# Patient Record
Sex: MALE | Race: WHITE | NOT HISPANIC OR LATINO | Employment: OTHER | URBAN - METROPOLITAN AREA
[De-identification: names, ages, dates, MRNs, and addresses within clinical notes are randomized per-mention and may not be internally consistent; named-entity substitution may affect disease eponyms.]

---

## 2023-04-21 LAB
ALANINE AMINOTRANSFERASE (SGPT) (U/L) IN SER/PLAS: 20 U/L (ref 10–52)
ALBUMIN (G/DL) IN SER/PLAS: 4.6 G/DL (ref 3.4–5)
ALKALINE PHOSPHATASE (U/L) IN SER/PLAS: 54 U/L (ref 33–136)
ANION GAP IN SER/PLAS: 14 MMOL/L (ref 10–20)
ASPARTATE AMINOTRANSFERASE (SGOT) (U/L) IN SER/PLAS: 33 U/L (ref 9–39)
BILIRUBIN TOTAL (MG/DL) IN SER/PLAS: 1 MG/DL (ref 0–1.2)
CALCIUM (MG/DL) IN SER/PLAS: 9.7 MG/DL (ref 8.6–10.6)
CARBON DIOXIDE, TOTAL (MMOL/L) IN SER/PLAS: 28 MMOL/L (ref 21–32)
CHLORIDE (MMOL/L) IN SER/PLAS: 103 MMOL/L (ref 98–107)
CHOLESTEROL (MG/DL) IN SER/PLAS: 142 MG/DL (ref 0–199)
CHOLESTEROL IN HDL (MG/DL) IN SER/PLAS: 59.2 MG/DL
CHOLESTEROL/HDL RATIO: 2.4
CREATININE (MG/DL) IN SER/PLAS: 1.16 MG/DL (ref 0.5–1.3)
GFR MALE: 71 ML/MIN/1.73M2
GLUCOSE (MG/DL) IN SER/PLAS: 86 MG/DL (ref 74–99)
LDL: 71 MG/DL (ref 0–99)
POTASSIUM (MMOL/L) IN SER/PLAS: 3.8 MMOL/L (ref 3.5–5.3)
PROTEIN TOTAL: 6.9 G/DL (ref 6.4–8.2)
SODIUM (MMOL/L) IN SER/PLAS: 141 MMOL/L (ref 136–145)
TRIGLYCERIDE (MG/DL) IN SER/PLAS: 61 MG/DL (ref 0–149)
UREA NITROGEN (MG/DL) IN SER/PLAS: 21 MG/DL (ref 6–23)
VLDL: 12 MG/DL (ref 0–40)

## 2023-09-28 ENCOUNTER — APPOINTMENT (OUTPATIENT)
Dept: PRIMARY CARE | Facility: CLINIC | Age: 63
End: 2023-09-28
Payer: COMMERCIAL

## 2023-10-04 PROBLEM — Q23.3: Status: ACTIVE | Noted: 2023-10-04

## 2023-10-04 PROBLEM — K40.90 INGUINAL HERNIA: Status: ACTIVE | Noted: 2023-10-04

## 2023-10-04 PROBLEM — I34.1 MVP (MITRAL VALVE PROLAPSE): Status: ACTIVE | Noted: 2023-10-04

## 2023-10-04 PROBLEM — I10 HYPERTENSION: Status: ACTIVE | Noted: 2023-10-04

## 2023-10-04 PROBLEM — R93.1 AGATSTON CAC SCORE, >400: Status: ACTIVE | Noted: 2023-10-04

## 2023-10-04 PROBLEM — M48.061 LUMBAR SPINAL STENOSIS: Status: ACTIVE | Noted: 2023-10-04

## 2023-10-04 PROBLEM — M54.50 INTERMITTENT LOW BACK PAIN: Status: ACTIVE | Noted: 2023-10-04

## 2023-10-04 RX ORDER — ATORVASTATIN CALCIUM 20 MG/1
1 TABLET, FILM COATED ORAL DAILY
COMMUNITY
Start: 2022-09-23 | End: 2023-10-06 | Stop reason: ALTCHOICE

## 2023-10-04 RX ORDER — HYDROCHLOROTHIAZIDE 25 MG/1
1 TABLET ORAL DAILY
COMMUNITY
Start: 2022-09-23 | End: 2023-11-06

## 2023-10-04 ASSESSMENT — PROMIS GLOBAL HEALTH SCALE
RATE_AVERAGE_PAIN: 2
EMOTIONAL_PROBLEMS: NEVER
RATE_GENERAL_HEALTH: VERY GOOD
RATE_QUALITY_OF_LIFE: VERY GOOD
RATE_PHYSICAL_HEALTH: VERY GOOD
CARRYOUT_PHYSICAL_ACTIVITIES: COMPLETELY
RATE_MENTAL_HEALTH: VERY GOOD
CARRYOUT_SOCIAL_ACTIVITIES: VERY GOOD
RATE_SOCIAL_SATISFACTION: VERY GOOD

## 2023-10-06 ENCOUNTER — LAB (OUTPATIENT)
Dept: LAB | Facility: LAB | Age: 63
End: 2023-10-06
Payer: COMMERCIAL

## 2023-10-06 ENCOUNTER — OFFICE VISIT (OUTPATIENT)
Dept: PRIMARY CARE | Facility: CLINIC | Age: 63
End: 2023-10-06
Payer: COMMERCIAL

## 2023-10-06 VITALS
HEART RATE: 56 BPM | OXYGEN SATURATION: 99 % | TEMPERATURE: 97.2 F | WEIGHT: 170 LBS | DIASTOLIC BLOOD PRESSURE: 80 MMHG | BODY MASS INDEX: 23.03 KG/M2 | SYSTOLIC BLOOD PRESSURE: 134 MMHG | HEIGHT: 72 IN

## 2023-10-06 DIAGNOSIS — Z00.00 ANNUAL PHYSICAL EXAM: ICD-10-CM

## 2023-10-06 DIAGNOSIS — Z00.00 ANNUAL PHYSICAL EXAM: Primary | ICD-10-CM

## 2023-10-06 DIAGNOSIS — Z23 NEED FOR INFLUENZA VACCINATION: ICD-10-CM

## 2023-10-06 DIAGNOSIS — I10 PRIMARY HYPERTENSION: ICD-10-CM

## 2023-10-06 DIAGNOSIS — K40.90 NON-RECURRENT UNILATERAL INGUINAL HERNIA WITHOUT OBSTRUCTION OR GANGRENE: ICD-10-CM

## 2023-10-06 DIAGNOSIS — R93.1 AGATSTON CAC SCORE, >400: ICD-10-CM

## 2023-10-06 PROCEDURE — 81003 URINALYSIS AUTO W/O SCOPE: CPT

## 2023-10-06 PROCEDURE — 80053 COMPREHEN METABOLIC PANEL: CPT

## 2023-10-06 PROCEDURE — 90471 IMMUNIZATION ADMIN: CPT | Performed by: INTERNAL MEDICINE

## 2023-10-06 PROCEDURE — 3079F DIAST BP 80-89 MM HG: CPT | Performed by: INTERNAL MEDICINE

## 2023-10-06 PROCEDURE — 36415 COLL VENOUS BLD VENIPUNCTURE: CPT

## 2023-10-06 PROCEDURE — 3075F SYST BP GE 130 - 139MM HG: CPT | Performed by: INTERNAL MEDICINE

## 2023-10-06 PROCEDURE — 90686 IIV4 VACC NO PRSV 0.5 ML IM: CPT | Performed by: INTERNAL MEDICINE

## 2023-10-06 PROCEDURE — 80061 LIPID PANEL: CPT

## 2023-10-06 PROCEDURE — 85027 COMPLETE CBC AUTOMATED: CPT

## 2023-10-06 PROCEDURE — 84153 ASSAY OF PSA TOTAL: CPT

## 2023-10-06 PROCEDURE — 99396 PREV VISIT EST AGE 40-64: CPT | Performed by: INTERNAL MEDICINE

## 2023-10-06 PROCEDURE — 1036F TOBACCO NON-USER: CPT | Performed by: INTERNAL MEDICINE

## 2023-10-06 RX ORDER — ATORVASTATIN CALCIUM 10 MG/1
10 TABLET, FILM COATED ORAL DAILY
COMMUNITY
End: 2024-04-12 | Stop reason: WASHOUT

## 2023-10-06 ASSESSMENT — LIFESTYLE VARIABLES
HOW OFTEN DO YOU HAVE SIX OR MORE DRINKS ON ONE OCCASION: NEVER
HOW OFTEN DO YOU HAVE A DRINK CONTAINING ALCOHOL: 4 OR MORE TIMES A WEEK
HOW MANY STANDARD DRINKS CONTAINING ALCOHOL DO YOU HAVE ON A TYPICAL DAY: 1 OR 2
SKIP TO QUESTIONS 9-10: 1
AUDIT-C TOTAL SCORE: 4

## 2023-10-06 ASSESSMENT — PATIENT HEALTH QUESTIONNAIRE - PHQ9
1. LITTLE INTEREST OR PLEASURE IN DOING THINGS: NOT AT ALL
2. FEELING DOWN, DEPRESSED OR HOPELESS: NOT AT ALL
SUM OF ALL RESPONSES TO PHQ9 QUESTIONS 1 & 2: 0

## 2023-10-06 ASSESSMENT — COLUMBIA-SUICIDE SEVERITY RATING SCALE - C-SSRS
1. IN THE PAST MONTH, HAVE YOU WISHED YOU WERE DEAD OR WISHED YOU COULD GO TO SLEEP AND NOT WAKE UP?: NO
2. HAVE YOU ACTUALLY HAD ANY THOUGHTS OF KILLING YOURSELF?: NO
6. HAVE YOU EVER DONE ANYTHING, STARTED TO DO ANYTHING, OR PREPARED TO DO ANYTHING TO END YOUR LIFE?: NO

## 2023-10-06 NOTE — PROGRESS NOTES
Subjective   Patient ID: Facundo Ravi is a 63 y.o. male who presents for Annual Exam.    HPI   Not checking BP at home.  No lightheadedness, no CP, no HA.    Tolerating statin without issues.    L inguinal hernia hasn't caused him discomfort.    He got a letter from GI that he is due for colonoscopy.    Exercising daily  Diet healthy      Review of Systems    Objective   /80   Pulse 56   Temp 36.2 °C (97.2 °F)   Ht 1.829 m (6')   Wt 77.1 kg (170 lb)   SpO2 99%   BMI 23.06 kg/m²     Physical Exam  Constitutional:       Appearance: Normal appearance.   HENT:      Right Ear: Tympanic membrane and ear canal normal.      Left Ear: Tympanic membrane and ear canal normal.      Mouth/Throat:      Mouth: Mucous membranes are moist.   Eyes:      Extraocular Movements: Extraocular movements intact.      Pupils: Pupils are equal, round, and reactive to light.   Cardiovascular:      Rate and Rhythm: Normal rate and regular rhythm.      Heart sounds: No murmur heard.  Pulmonary:      Effort: Pulmonary effort is normal.      Breath sounds: Normal breath sounds.   Abdominal:      General: Bowel sounds are normal.      Palpations: Abdomen is soft.      Tenderness: There is no abdominal tenderness.      Comments: Nontender reducible L inguinal hernia   Musculoskeletal:         General: No deformity.      Cervical back: Neck supple.      Right lower leg: No edema.      Left lower leg: No edema.   Skin:     Findings: No lesion or rash.   Neurological:      Mental Status: He is alert.      Cranial Nerves: No cranial nerve deficit.      Motor: No weakness.      Gait: Gait normal.         Assessment/Plan   Problem List Items Addressed This Visit             ICD-10-CM    Agatston CAC score, >400 R93.1    Hypertension I10    Inguinal hernia K40.90    Relevant Orders    Referral to General Surgery    Annual physical exam - Primary Z00.00    Relevant Orders    Colonoscopy Screening    CBC    Comprehensive Metabolic Panel     Prostate Specific Antigen, Screen    Lipid Panel    Urinalysis with Reflex Microscopic     Other Visit Diagnoses         Codes    Need for influenza vaccination     Z23    Relevant Orders    Flu vaccine (IIV4) age 6 months and greater, preservative free               HTN  -Continue current med. Advised to start checking BP at home, call if >130/80 consistently.    CAC score >500 in 2021  -Continue statin    L inguinal hernia - Surgery referral    Health maintenance:  -Colon cancer screening - Colonoscopy 8/2016 with Dr Vollweiler, normal, due in 7-10 years. Ordered today.  -Prostate - PSA ordered  -HCV neg  -Immunizations    -Pneumonia -Had pneumovax, will need series at age 64   -Shingrix UTD   -Covid booster declined   -Flu - today    Follow up 6 months

## 2023-10-07 LAB
ALBUMIN SERPL BCP-MCNC: 4.7 G/DL (ref 3.4–5)
ALP SERPL-CCNC: 50 U/L (ref 33–136)
ALT SERPL W P-5'-P-CCNC: 25 U/L (ref 10–52)
ANION GAP SERPL CALC-SCNC: 13 MMOL/L (ref 10–20)
APPEARANCE UR: CLEAR
AST SERPL W P-5'-P-CCNC: 36 U/L (ref 9–39)
BILIRUB SERPL-MCNC: 1.3 MG/DL (ref 0–1.2)
BILIRUB UR STRIP.AUTO-MCNC: NEGATIVE MG/DL
BUN SERPL-MCNC: 18 MG/DL (ref 6–23)
CALCIUM SERPL-MCNC: 10.3 MG/DL (ref 8.6–10.6)
CHLORIDE SERPL-SCNC: 100 MMOL/L (ref 98–107)
CHOLEST SERPL-MCNC: 149 MG/DL (ref 0–199)
CHOLESTEROL/HDL RATIO: 2.3
CO2 SERPL-SCNC: 29 MMOL/L (ref 21–32)
COLOR UR: YELLOW
CREAT SERPL-MCNC: 0.9 MG/DL (ref 0.5–1.3)
ERYTHROCYTE [DISTWIDTH] IN BLOOD BY AUTOMATED COUNT: 12.2 % (ref 11.5–14.5)
GFR SERPL CREATININE-BSD FRML MDRD: >90 ML/MIN/1.73M*2
GLUCOSE SERPL-MCNC: 86 MG/DL (ref 74–99)
GLUCOSE UR STRIP.AUTO-MCNC: NEGATIVE MG/DL
HCT VFR BLD AUTO: 43.9 % (ref 41–52)
HDLC SERPL-MCNC: 64.3 MG/DL
HGB BLD-MCNC: 14.2 G/DL (ref 13.5–17.5)
KETONES UR STRIP.AUTO-MCNC: ABNORMAL MG/DL
LDLC SERPL CALC-MCNC: 73 MG/DL (ref 140–190)
LEUKOCYTE ESTERASE UR QL STRIP.AUTO: NEGATIVE
MCH RBC QN AUTO: 29.5 PG (ref 26–34)
MCHC RBC AUTO-ENTMCNC: 32.3 G/DL (ref 32–36)
MCV RBC AUTO: 91 FL (ref 80–100)
NITRITE UR QL STRIP.AUTO: NEGATIVE
NON HDL CHOLESTEROL: 85 MG/DL (ref 0–149)
NRBC BLD-RTO: 0 /100 WBCS (ref 0–0)
PH UR STRIP.AUTO: 5 [PH]
PLATELET # BLD AUTO: 237 X10*3/UL (ref 150–450)
PMV BLD AUTO: 10.3 FL (ref 7.5–11.5)
POTASSIUM SERPL-SCNC: 4 MMOL/L (ref 3.5–5.3)
PROT SERPL-MCNC: 7.3 G/DL (ref 6.4–8.2)
PROT UR STRIP.AUTO-MCNC: NEGATIVE MG/DL
PSA SERPL-MCNC: 0.66 NG/ML
RBC # BLD AUTO: 4.81 X10*6/UL (ref 4.5–5.9)
RBC # UR STRIP.AUTO: NEGATIVE /UL
SODIUM SERPL-SCNC: 138 MMOL/L (ref 136–145)
SP GR UR STRIP.AUTO: 1.02
TRIGL SERPL-MCNC: 57 MG/DL (ref 0–149)
UROBILINOGEN UR STRIP.AUTO-MCNC: <2 MG/DL
VLDL: 11 MG/DL (ref 0–40)
WBC # BLD AUTO: 5.6 X10*3/UL (ref 4.4–11.3)

## 2023-10-23 ENCOUNTER — HOSPITAL ENCOUNTER (OUTPATIENT)
Dept: CARDIOLOGY | Facility: CLINIC | Age: 63
Discharge: HOME | End: 2023-10-23
Payer: COMMERCIAL

## 2023-10-23 ENCOUNTER — OFFICE VISIT (OUTPATIENT)
Dept: SURGERY | Facility: CLINIC | Age: 63
End: 2023-10-23
Payer: COMMERCIAL

## 2023-10-23 ENCOUNTER — PREP FOR PROCEDURE (OUTPATIENT)
Dept: SURGERY | Facility: CLINIC | Age: 63
End: 2023-10-23

## 2023-10-23 VITALS
OXYGEN SATURATION: 100 % | HEIGHT: 72 IN | DIASTOLIC BLOOD PRESSURE: 65 MMHG | WEIGHT: 173.3 LBS | SYSTOLIC BLOOD PRESSURE: 136 MMHG | HEART RATE: 52 BPM | BODY MASS INDEX: 23.47 KG/M2

## 2023-10-23 DIAGNOSIS — K40.90 NON-RECURRENT UNILATERAL INGUINAL HERNIA WITHOUT OBSTRUCTION OR GANGRENE: ICD-10-CM

## 2023-10-23 DIAGNOSIS — K40.90 REDUCIBLE LEFT INGUINAL HERNIA: Primary | ICD-10-CM

## 2023-10-23 DIAGNOSIS — Z01.818 PREOP TESTING: ICD-10-CM

## 2023-10-23 DIAGNOSIS — Z01.818 PREOP TESTING: Primary | ICD-10-CM

## 2023-10-23 PROCEDURE — 93010 ELECTROCARDIOGRAM REPORT: CPT | Performed by: INTERNAL MEDICINE

## 2023-10-23 PROCEDURE — 93005 ELECTROCARDIOGRAM TRACING: CPT

## 2023-10-23 PROCEDURE — 3078F DIAST BP <80 MM HG: CPT | Performed by: SURGERY

## 2023-10-23 PROCEDURE — 1036F TOBACCO NON-USER: CPT | Performed by: SURGERY

## 2023-10-23 PROCEDURE — 99204 OFFICE O/P NEW MOD 45 MIN: CPT | Performed by: SURGERY

## 2023-10-23 PROCEDURE — 3075F SYST BP GE 130 - 139MM HG: CPT | Performed by: SURGERY

## 2023-10-23 RX ORDER — TRAMADOL HYDROCHLORIDE 50 MG/1
50 TABLET ORAL EVERY 8 HOURS PRN
Qty: 9 TABLET | Refills: 0 | Status: SHIPPED | OUTPATIENT
Start: 2023-10-23 | End: 2023-10-26

## 2023-10-23 RX ORDER — SODIUM CHLORIDE, SODIUM LACTATE, POTASSIUM CHLORIDE, CALCIUM CHLORIDE 600; 310; 30; 20 MG/100ML; MG/100ML; MG/100ML; MG/100ML
100 INJECTION, SOLUTION INTRAVENOUS CONTINUOUS
Status: CANCELLED | OUTPATIENT
Start: 2023-10-23

## 2023-10-23 ASSESSMENT — PAIN SCALES - GENERAL: PAINLEVEL: 0-NO PAIN

## 2023-10-23 NOTE — H&P (VIEW-ONLY)
History Of Present Illness :  Facundo Ravi is a 63 y.o. male who presents for an inguinal evaluation on referral from his primary physician, Dr. Giovanni Landa.  The patient recently saw  on 10/6/2023 that note was reviewed.  On examination, a nontender reducible left inguinal hernia was identified.  Laboratory values that day included a CBC and CMP both of which were normal.  The patient is due for colonoscopy and this has been arranged.  His last colonoscopy was August 2016.    About 6 months ago in the spring, the patient noted a bulge in the left groin on self-exam when showering.  This was confirmed to be a hernia on the visit shortly thereafter with Dr. Landa.  As the hernia was asymptomatic, the patient elected to wait till the fall to have repair.  He is very active.  He plays softball and basketball and works out on a regular basis.  The patient's had no symptoms.  He has no associated pain.  He notes no bulge on the contralateral side.  He denies any gastrointestinal symptoms such as nausea vomiting diarrhea constipation changes bowel habits or blood in the stool.  He has had no abdominal or inguinal operations in the past.  He has had no major operations.  He is in good health other than hypertension.    Patient is .  Lives in [place].  He has 7 children, 6 of whom are living.  2 of his sons live with him and his wife at the house.  He is retired from [employer] - .        Past Medical History   Past Medical History:   Diagnosis Date    Acne, unspecified 10/27/2017    Adult acne    Acute eczematoid otitis externa, unspecified ear 09/26/2014    Otitis externa, acute eczematoid    Allergic 1965    Biliuria 10/08/2021    Bilirubin in urine    Disorder of pigmentation, unspecified 05/03/2018    Pigmented skin lesion of uncertain nature    Elevated blood-pressure reading, without diagnosis of hypertension 12/08/2013    Elevated blood pressure reading without  diagnosis of hypertension    Encounter for general adult medical examination without abnormal findings 12/08/2013    Physical exam, routine    Encounter for general adult medical examination without abnormal findings 11/03/2020    Physical exam, annual    Encounter for general adult medical examination without abnormal findings 10/22/2019    Encounter for preventive health examination    Encounter for health counseling related to travel 02/12/2015    Counseling for travel    Encounter for immunization 12/08/2013    Need for prophylactic vaccination and inoculation against influenza    Encounter for immunization 11/03/2020    Need for DTP vaccine    Encounter for screening for malignant neoplasm of colon 11/03/2020    Colon cancer screening    Encounter for screening for malignant neoplasm of colon 12/08/2013    Encounter for screening for malignant neoplasm of colon    Encounter for screening for malignant neoplasm of prostate 11/03/2020    Prostate cancer screening    Encounter for screening for other viral diseases 11/03/2020    Need for hepatitis C screening test    HL (hearing loss) 2000    Hypertension 2021    Low back pain, unspecified 12/08/2013    Lumbago    Olecranon bursitis, right elbow 05/31/2018    Olecranon bursitis of right elbow    Other microscopic hematuria     Microscopic hematuria    Pain in right hip 10/22/2019    Hip pain, right    Personal history of diseases of the skin and subcutaneous tissue 09/21/2018    History of eczema    Personal history of other endocrine, nutritional and metabolic disease 10/22/2019    History of hypercalcemia    Personal history of other specified conditions 10/22/2019    History of nocturia    Radiculopathy, thoracic region 02/23/2015    Radiculopathy of thoracic region    Spinal stenosis, lumbar region without neurogenic claudication 10/22/2019    Foraminal stenosis of lumbar region    Sprain of unspecified parts of thorax, initial encounter 02/27/2015    Thoracic  back sprain    Xerosis cutis 09/26/2014    Dry skin        Surgical History    Past Surgical History:   Procedure Laterality Date    COLONOSCOPY  10/22/2019    Complete Colonoscopy    EYE SURGERY  2003    OTHER SURGICAL HISTORY  09/26/2014    Need For Vaccination Typhoid    OTHER SURGICAL HISTORY  09/02/2016    Lymph Node Biopsy - Needle        Allergies   Allergies   Allergen Reactions    Amoxicillin Unknown    Penicillins Hives     SEVERE        Home Meds  Current Outpatient Medications   Medication Instructions    atorvastatin (LIPITOR) 10 mg, oral, Daily    hydroCHLOROthiazide (HYDRODiuril) 25 mg tablet 1 tablet, oral, Daily        Family History    Family History   Problem Relation Name Age of Onset    Other (CEREBRAL INFARCTION) Mother Macie V     Hearing loss Mother Macie V     Stroke Mother Macie V     Other (CAROTID ARTERIAL DISEASE) Father Hector V     Coronary artery disease Father Hector V     Heart disease Father Hector V     Hernia Father Hector V     Hypertension Father Hector V     Crohn's disease Daughter Andreia V     Wilm's tumor Daughter Andreia V     Cancer Daughter Andreia V     Colon cancer Maternal Grandfather Michelet COTA     Ulcers Sibling      Arthritis Maternal Grandmother Melissa COTA     Heart disease Paternal Grandfather Danny V         Social History  Social History     Tobacco Use    Smoking status: Never    Smokeless tobacco: Never   Substance Use Topics    Alcohol use: Yes    Drug use: Never        Review Of Systems    Review of Systems    General: Not Present- Obesity, Cancer, HIV, MRSA, Recent Cold/Flu, Tired During the Day and VRE.  HEENT: Not Present- Migraine, Cataracts, Glaucoma, Macular Degeneration and Retinal Detachment.  Respiratory:Not Present- Asthma, Chronic Cough, Difficulty Breathing on Exertion, Difficulty Breathing at Rest, Emphysema, Frequent Bronchitis, Home CPAP/BiPAP, Home Oxygen, Pulmonary Embolus, Pneumonia/TB, Sleep Apnea and Snoring.  Cardiovascular: Not Present-  Chest Pain, Congestive Heart Failure, Heart Attack, Coronary Artery Disease, Heart Stent, High Cholesterol/Lipids, Internal Defibrillator, Irregular Heart Beat, Mitral Valve Prolapse, Murmur, Pacemaker and Peripheral Vascular Disease.  Gastrointestinal: Not Present- Heartburn, Hepatitis, Hiatal Hernia, Jaundice, Reflux, Stomach Ulcer and IBS.  Male Genitourinary: Not Present- Enlarged Prostate, Kidney Failure, Kidney Stones, Dialysis and Urinary Tract Infection.  Musculoskeletal: Not Present- Arthritis, Back Pain and Fibromyalgia.  Neurological: Not Present- Headaches, Numbness, Tingling, Seizures, Stroke,  Shunt and Weakness.  Psychiatric: Not Present- Anxiety, Bipolar, Depression and Panic Attacks.  Endocrine: Not Present- Diabetes, Hyperthyroidism, Hypothyroidism and Low Blood Sugar.  Hematology: Not Present- Abnormal Bleeding, Anemia and Blood Clots.    Vitals  Visit Vitals  /65 (BP Location: Right arm, Patient Position: Sitting, BP Cuff Size: Small adult)   Pulse 52        Physical Exam   The physical exam findings are as follows:    General Appearance - Cooperative and Well groomed. Not in acute distress. Build & Nutrition - Well nourished.  Posture - Normal posture. Gait - Normal. Hydration - Well hydrated.    Integumentary  General Characteristics: Overall examination of the patient's skin reveals - no rashes, no suspicious lesions, no bruises and no evidence of scars. Color - normal coloration of skin. Skin Moisture - normal skin moisture. Temperature - normal  warmth is noted. Texture - normal skin texture.    Head and Neck  Head - normocephalic, atraumatic with no lesions or palpable masses.  Neck  Global Assessment - full range of motion. no bruit auscultated on the right, no bruit auscultated on the left, non-tender, no lymphadenopathy, no palpable mass on the right and no palpable mass on the left.  Trachea - midline.  Thyroid Gland Characteristics - no palpable nodules, normal position,  symmetric and smooth.    Eyes  Sclera/Conjunctiva - Bilateral - Normal. Pupil - Bilateral - Accommodating, Direct reaction to light normal and Equal.    ENMT  Global Assessment  Upon examination of the ears, nose, mouth and throat - examination of the oral cavity reveals normal lips, teeth, gums and oral mucosa and hard and soft palates, tongue, tonsils and posterior pharynx are moist and symmetric without lesions.    Chest and Lung Exam  Inspection: Shape - Symmetric. Movements - Symmetrical. Accessory muscles - No use of accessory muscles in breathing.  Percussion:  Quality and Intensity: - Percussion normal.  Palpation: - Palpation normal.  Auscultation:  Breath sounds: - Normal and equal bilaterally.  Adventitious sounds: - none bilaterally.    Cardiovascular  Inspection: Carotid artery - Bilateral - Inspection Normal.  Palpation/Percussion: Examination by palpation and percussion reveals - No Thrills.  Cardiac Borders - Normal.  Auscultation: Rhythm - Regular. Rate: Regular.  Heart Sounds - Normal heart sounds, S1 WNL and S2 WNL.  Murmurs & Other Heart Sounds: Auscultation of the heart reveals - No Murmurs or other extra heart sounds.    Abdomen  Inspection: Inspection of the abdomen reveals - No Hernias.  Palpation/Percussion: Palpation and Percussion of the abdomen reveal - Non Tender and No Palpable abdominal  masses.  Liver: - Normal.  Spleen: - Normal.  Auscultation: Auscultation of the abdomen reveals - Bowel sounds normal.  Surgical scars: none    Inguinal and External Genitalia    The patient was examined supine, and standing, with and without Valsalva. There is evidence of a small reducible right inguinal hernia. There is no left inguinal hernia. There may be some minimal fullness at the left external ring, but no outright hernia.  There is no femoral hernia bilaterally. There is no evidence of inguinal or femoral lymphadenopathy bilaterally. The testes are descended bilaterally and symmetric, with no  masses, nodules, or tenderness.      Rectal - Did not examine.    Peripheral Vascular  Lower Extremity: Inspection - Bilateral - No Varicose veins.  Palpation: Edema - Bilateral - No edema.    Musculoskeletal  Global Assessment  Right Upper Extremity - no deformities, masses or tenderness, no known fractures, normal strength and tone and  normal range of motion without pain. Left Upper Extremity - no deformities, masses or tenderness, no known fractures,  normal strength and tone and normal range of motion without pain. Right Lower Extremity - no deformities, masses or  tenderness, no known fractures, normal strength and tone and normal range of motion without pain. Left Lower  Extremity - no deformities, masses or tenderness, no known fractures, normal strength and tone and normal range of  motion without pain.    Lymphatic  Head & Neck  General Head & Neck Lymphatics:  Bilateral: Description - Normal.  Axillary  General Axillary Region:  Bilateral: Description - Normal.  Femoral & Inguinal  Generalized Femoral & Inguinal Lymphatics:  Bilateral: Description - Normal.  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    Assessment/Plan   Mr. Ravi has a small reducible left inguinal hernia.    Recommendation:      In order to prevent symptoms and complications from this hernia, left inguinal herniorrhaphy is indicated and recommended.    Surgical options were explained to the patient.  Options included traditional open repair with mesh, under local anesthesia and IV sedation (MAC anesthesia) , versus a robotic-assisted, transabdominal, laparoscopic repair with mesh under general anesthesia. Both operations are outpatient or ambulatory surgery at [facility].   Educational handouts were given to the patient regarding inguinal hernia repair, from the [employer], and also [employer] (da Aminta robot) Surgery.    The patient is decided to proceed with the robotic laparoscopic method.   We will plan this for Friday, 11/3/2023 at [facility].    I will see the patient for a postoperative visit my [facility] office on Monday, 11/13/2023.    The patient's recent CBC and CMP from 10/6/2023 were essentially normal.  Patient require preoperative EKG.  This will be performed this morning at [facility].    For postoperative analgesia/pain relief, I recommend:     a.  Tylenol Extra Strength 500 mg with ibuprofen 600 mg (three, 200 mg Advil or Motrin tablets ) 4 times a day with food, on schedule, for 2-3 days, then as needed thereafter.      b.  Supplement with Tramadol 50 mg, dispense #9 for more severe or breakthrough pain, as needed.  This has been electronically prescribed to your pharmacy.  Please  this prescription within 7 days of today's visit, or the pharmacist will not fill the prescription.    Inguinal Hernia Consent:  The procedure was explained to the patient in detail, including the risks, benefits and alternatives. The risks include, but are not limited to, infection, bleeding, hematoma, seroma, recurrence of the hernia, injury to local nerves resulting in pain or numbness, injury to the spermatic cord resulting in pain, swelling or atrophy of the testicle (in a male), persistent inguinal pain or chronic pain syndrome and, need for further surgery. The patient agreed with plan and signed consent.

## 2023-10-23 NOTE — PROGRESS NOTES
ED Attending Physician Note      Patient : Jayden Arellano Age: 87 year old Sex: male   MRN: [id number] Encounter Date: 10/4/2023    Participated in patient history, physical, MDM.  I evaluated the patient, discussed with the resident or mid-level as applicable, and agree with the findings and plan as documented in the resident or mid-level's note.  I supervised all resident/mid-level procedures.    History     Chief Complaint   Patient presents with   • Chest Pain   • Shortness of Breath     Pt coming from outpatient center. Per outpatient RN, +PE.      Patient is a 86 yo male past medical hx of hypertension, HLD, TIA, BPH, hx AAA, bradycardia, recent hx of TIA vs ischemic CVA and hypertensive urgency on 8/17/23 admitted for Sepsis 2/2 UTI presenting to the ER for after outpatient testing showed PE.  Patient reports for the last 2 days has had left sided chest pain shortness of breath and left leg pain.  Patient saw his cardiologist had a CT scan today they were told that he had a pulmonary embolism he was started on Eliquis and then sent to the ER.  No history of PE in the past          Location:[]See narrative  Quality: []See narrative []Aching []Sharp []Dull []Cramping []Pressure []Burning []Other  Duration: []See narrative []Today []Yesterday []Other:   Timing: []Constant []Intermittent  Severity: []See narrative []Mild []Moderate []Severe  Alleviating or aggravating factors: []See narrative []None []Other:   Associated symptoms: []See narrative  Context: []See narrative     PAST HISTORY         Past Medical History:   Diagnosis Date   • Aneurysm (CMD)    • Arthritis    • Essential (primary) hypertension    • Failed moderate sedation during procedure    • High cholesterol    • TIA (transient ischemic attack)     Past Surgical History:   Procedure Laterality Date   • APPENDECTOMY     • EYE SURGERY      cataract   • JOINT REPLACEMENT Right     knee        Additional PMH (also as noted in hpi & pmh section):  []  History Of Present Illness :  Facundo Ravi is a 63 y.o. male who presents for an inguinal evaluation on referral from his primary physician, Dr. Giovanni Landa.  The patient recently saw  on 10/6/2023 that note was reviewed.  On examination, a nontender reducible left inguinal hernia was identified.  Laboratory values that day included a CBC and CMP both of which were normal.  The patient is due for colonoscopy and this has been arranged.  His last colonoscopy was August 2016.    About 6 months ago in the spring, the patient noted a bulge in the left groin on self-exam when showering.  This was confirmed to be a hernia on the visit shortly thereafter with Dr. Landa.  As the hernia was asymptomatic, the patient elected to wait till the fall to have repair.  He is very active.  He plays softball and basketball and works out on a regular basis.  The patient's had no symptoms.  He has no associated pain.  He notes no bulge on the contralateral side.  He denies any gastrointestinal symptoms such as nausea vomiting diarrhea constipation changes bowel habits or blood in the stool.  He has had no abdominal or inguinal operations in the past.  He has had no major operations.  He is in good health other than hypertension.    Patient is .  Lives in [place].  He has 7 children, 6 of whom are living.  2 of his sons live with him and his wife at the house.  He is retired from [employer] - .        Past Medical History   Past Medical History:   Diagnosis Date    Acne, unspecified 10/27/2017    Adult acne    Acute eczematoid otitis externa, unspecified ear 09/26/2014    Otitis externa, acute eczematoid    Allergic 1964    Biliuria 10/08/2021    Bilirubin in urine    Disorder of pigmentation, unspecified 05/03/2018    Pigmented skin lesion of uncertain nature    Elevated blood-pressure reading, without diagnosis of hypertension 12/08/2013    Elevated blood pressure reading without  Denies PMH  [] As Above Additional PSH (also as noted in hpi & PSH section) :  [] Denies PSH  [] As above          Social History     Tobacco Use   • Smoking status: Never   • Smokeless tobacco: Never   Vaping Use   • Vaping Use: never used   Substance Use Topics   • Alcohol use: Never   • Drug use: Never    Family History   Problem Relation Age of Onset   • Cancer Brother         colon       Additional SH:  [] Denies etoh  [] Denies tob  [] Denies illicit substances  [] Lives at home  [] Lives in nursing home / care facility  [] Lives in assisted living / group home Additional FH:          Prior to Admission medications    Medication Sig Start Date End Date Taking? Authorizing Provider   trimethoprim (PROLOPRIM) 100 MG tablet Take 100 mg by mouth daily. Indications: Simple Infection of the Urinary Tract 9/16/23   System, Provider Not In   atorvastatin (LIPITOR) 20 MG tablet Take 1 tablet by mouth nightly. 9/1/23   Clovis Ramires MD   losartan (COZAAR) 50 MG tablet Take 1 tablet by mouth daily.  Patient taking differently: Take 25 mg by mouth daily. 8/19/23   Aggie Charlton, DO   amLODIPine (NORVASC) 5 MG tablet Take 5 mg by mouth daily. Indications: High Blood Pressure Disorder Do not start before September 16, 2023. 9/16/23   Provider, Outside   senna (SENOKOT) 8.6 MG tablet Take 1 tablet by mouth daily. Indications: Constipation Do not start before September 16, 2023. 9/16/23   Provider, Outside   finasteride (PROSCAR) 5 MG tablet Take 5 mg by mouth daily. Indications: Benign Enlargement of Prostate Do not start before September 16, 2023. 9/16/23   Provider, Outside   latanoprost (XALATAN) 0.005 % ophthalmic solution Place 1 drop into both eyes nightly. Indications: Wide-Angle Glaucoma Do not start before September 16, 2023. 9/16/23   Provider, Outside   Vitamin D, Ergocalciferol, 1.25 mg (50,000 units) capsule Take 1.25 mg by mouth 1 day a week. Indications: Vitamin D Deficiency Thursday 2/2/21   Provider,  diagnosis of hypertension    Encounter for general adult medical examination without abnormal findings 12/08/2013    Physical exam, routine    Encounter for general adult medical examination without abnormal findings 11/03/2020    Physical exam, annual    Encounter for general adult medical examination without abnormal findings 10/22/2019    Encounter for preventive health examination    Encounter for health counseling related to travel 02/12/2015    Counseling for travel    Encounter for immunization 12/08/2013    Need for prophylactic vaccination and inoculation against influenza    Encounter for immunization 11/03/2020    Need for DTP vaccine    Encounter for screening for malignant neoplasm of colon 11/03/2020    Colon cancer screening    Encounter for screening for malignant neoplasm of colon 12/08/2013    Encounter for screening for malignant neoplasm of colon    Encounter for screening for malignant neoplasm of prostate 11/03/2020    Prostate cancer screening    Encounter for screening for other viral diseases 11/03/2020    Need for hepatitis C screening test    HL (hearing loss) 2000    Hypertension 2021    Low back pain, unspecified 12/08/2013    Lumbago    Olecranon bursitis, right elbow 05/31/2018    Olecranon bursitis of right elbow    Other microscopic hematuria     Microscopic hematuria    Pain in right hip 10/22/2019    Hip pain, right    Personal history of diseases of the skin and subcutaneous tissue 09/21/2018    History of eczema    Personal history of other endocrine, nutritional and metabolic disease 10/22/2019    History of hypercalcemia    Personal history of other specified conditions 10/22/2019    History of nocturia    Radiculopathy, thoracic region 02/23/2015    Radiculopathy of thoracic region    Spinal stenosis, lumbar region without neurogenic claudication 10/22/2019    Foraminal stenosis of lumbar region    Sprain of unspecified parts of thorax, initial encounter 02/27/2015    Thoracic  Outside   Klor-Con M 20 MEQ CR tablet Take 20 mEq by mouth daily. Indications: Low Amount of Potassium in the Blood Take with food 2/14/21   Provider, Outside   polyethylene glycol (MIRALAX) 17 g packet Take 17 g by mouth daily. Indications: Constipation Do not start before September 16, 2023. Stir and dissolve powder in any 4 to 8 ounces of beverage, then drink.  9/16/23   Provider, Outside   carvedilol (COREG) 6.25 MG tablet Take 6.25 mg by mouth 2 times daily (with meals).  7/10/22  Provider, Outside     Allergies   Allergen Reactions   • Chlorhexidine PRURITUS   • Gnp Bathing Cloths HIVES     Developed rash & hives from purple colored \"ReadyBath Fresh Bathing Cloth\" wipes.    • Ace Inhibitors Cough   • Pregabalin Nausea & Vomiting         Review of Systems   Constitutional: Negative for chills and fever.   HENT: Negative for congestion and sore throat.    Eyes: Negative for visual disturbance.   Respiratory: Positive for shortness of breath.    Cardiovascular: Positive for chest pain and leg swelling.   Gastrointestinal: Negative for abdominal pain.   Genitourinary: Negative for dysuria.   Musculoskeletal: Negative for arthralgias.   Skin: Negative for rash.   Neurological: Negative for dizziness and headaches.   Psychiatric/Behavioral: Negative for behavioral problems.       Physical Exam     ED Triage Vitals [10/04/23 1621]   ED Triage Vitals Group      Temp 97.9 °F (36.6 °C)      Heart Rate 63      Resp 16      BP (!) 147/91      SpO2 97 %      EtCO2 mmHg       Height       Weight 178 lb 2.1 oz (80.8 kg)      Weight Scale Used Standing scale      BMI (Calculated)       IBW/kg (Calculated)        Physical Exam  Vitals reviewed.   Constitutional:       Appearance: Normal appearance.   HENT:      Head: Normocephalic.      Mouth/Throat:      Mouth: Mucous membranes are moist.      Pharynx: Oropharynx is clear.      Neck: Neck supple.   Eyes:      Extraocular Movements: Extraocular movements intact.       back sprain    Xerosis cutis 09/26/2014    Dry skin        Surgical History    Past Surgical History:   Procedure Laterality Date    COLONOSCOPY  10/22/2019    Complete Colonoscopy    EYE SURGERY  2003    OTHER SURGICAL HISTORY  09/26/2014    Need For Vaccination Typhoid    OTHER SURGICAL HISTORY  09/02/2016    Lymph Node Biopsy - Needle        Allergies   Allergies   Allergen Reactions    Amoxicillin Unknown    Penicillins Hives     SEVERE        Home Meds  Current Outpatient Medications   Medication Instructions    atorvastatin (LIPITOR) 10 mg, oral, Daily    hydroCHLOROthiazide (HYDRODiuril) 25 mg tablet 1 tablet, oral, Daily        Family History    Family History   Problem Relation Name Age of Onset    Other (CEREBRAL INFARCTION) Mother Macie V     Hearing loss Mother Macie V     Stroke Mother Macie V     Other (CAROTID ARTERIAL DISEASE) Father Hector V     Coronary artery disease Father Hector V     Heart disease Father Hector V     Hernia Father Hector V     Hypertension Father Hector V     Crohn's disease Daughter Andreia V     Wilm's tumor Daughter Andreia V     Cancer Daughter Andreia V     Colon cancer Maternal Grandfather Michelet COTA     Ulcers Sibling      Arthritis Maternal Grandmother Melissa COTA     Heart disease Paternal Grandfather Danny V         Social History  Social History     Tobacco Use    Smoking status: Never    Smokeless tobacco: Never   Substance Use Topics    Alcohol use: Yes    Drug use: Never        Review Of Systems    Review of Systems    General: Not Present- Obesity, Cancer, HIV, MRSA, Recent Cold/Flu, Tired During the Day and VRE.  HEENT: Not Present- Migraine, Cataracts, Glaucoma, Macular Degeneration and Retinal Detachment.  Respiratory:Not Present- Asthma, Chronic Cough, Difficulty Breathing on Exertion, Difficulty Breathing at Rest, Emphysema, Frequent Bronchitis, Home CPAP/BiPAP, Home Oxygen, Pulmonary Embolus, Pneumonia/TB, Sleep Apnea and Snoring.  Cardiovascular: Not Present-  Chest Pain, Congestive Heart Failure, Heart Attack, Coronary Artery Disease, Heart Stent, High Cholesterol/Lipids, Internal Defibrillator, Irregular Heart Beat, Mitral Valve Prolapse, Murmur, Pacemaker and Peripheral Vascular Disease.  Gastrointestinal: Not Present- Heartburn, Hepatitis, Hiatal Hernia, Jaundice, Reflux, Stomach Ulcer and IBS.  Male Genitourinary: Not Present- Enlarged Prostate, Kidney Failure, Kidney Stones, Dialysis and Urinary Tract Infection.  Musculoskeletal: Not Present- Arthritis, Back Pain and Fibromyalgia.  Neurological: Not Present- Headaches, Numbness, Tingling, Seizures, Stroke,  Shunt and Weakness.  Psychiatric: Not Present- Anxiety, Bipolar, Depression and Panic Attacks.  Endocrine: Not Present- Diabetes, Hyperthyroidism, Hypothyroidism and Low Blood Sugar.  Hematology: Not Present- Abnormal Bleeding, Anemia and Blood Clots.    Vitals  Visit Vitals  /65 (BP Location: Right arm, Patient Position: Sitting, BP Cuff Size: Small adult)   Pulse 52        Physical Exam   The physical exam findings are as follows:    General Appearance - Cooperative and Well groomed. Not in acute distress. Build & Nutrition - Well nourished.  Posture - Normal posture. Gait - Normal. Hydration - Well hydrated.    Integumentary  General Characteristics: Overall examination of the patient's skin reveals - no rashes, no suspicious lesions, no bruises and no evidence of scars. Color - normal coloration of skin. Skin Moisture - normal skin moisture. Temperature - normal  warmth is noted. Texture - normal skin texture.    Head and Neck  Head - normocephalic, atraumatic with no lesions or palpable masses.  Neck  Global Assessment - full range of motion. no bruit auscultated on the right, no bruit auscultated on the left, non-tender, no lymphadenopathy, no palpable mass on the right and no palpable mass on the left.  Trachea - midline.  Thyroid Gland Characteristics - no palpable nodules, normal position,  Conjunctiva/sclera: Conjunctivae normal.      Pupils: Pupils are equal, round, and reactive to light.   Cardiovascular:      Rate and Rhythm: Normal rate and regular rhythm.      Pulses: Normal pulses.           Radial pulses are 2+ on the right side and 2+ on the left side.      Heart sounds: Normal heart sounds.   Pulmonary:      Effort: Pulmonary effort is normal.      Breath sounds: Normal breath sounds.   Abdominal:      General: Abdomen is flat.      Palpations: Abdomen is soft.      Tenderness: There is no abdominal tenderness.   Musculoskeletal:         General: No swelling or deformity.      Right lower leg: No tenderness. No edema.      Left lower leg: Tenderness present. No edema.   Skin:     General: Skin is warm and dry.      Capillary Refill: Capillary refill takes less than 2 seconds.   Neurological:      General: No focal deficit present.      Mental Status: He is alert and oriented to person, place, and time. Mental status is at baseline.   Psychiatric:         Mood and Affect: Mood normal.         Behavior: Behavior normal.         ED Course     Procedures    Lab Results     Results for orders placed or performed during the hospital encounter of 10/04/23   CBC with Automated Differential (performable only)   Result Value Ref Range    WBC 9.3 4.2 - 11.0 K/mcL    RBC 4.35 (L) 4.50 - 5.90 mil/mcL    HGB 13.4 13.0 - 17.0 g/dL    HCT 41.0 39.0 - 51.0 %    MCV 94.3 78.0 - 100.0 fl    MCH 30.8 26.0 - 34.0 pg    MCHC 32.7 32.0 - 36.5 g/dL    RDW-CV 12.8 11.0 - 15.0 %    RDW-SD 44.3 39.0 - 50.0 fL     140 - 450 K/mcL    NRBC 0 <=0 /100 WBC    Neutrophil, Percent 62 %    Lymphocytes, Percent 24 %    Mono, Percent 13 %    Eosinophils, Percent 0 %    Basophils, Percent 0 %    Immature Granulocytes 1 %    Absolute Neutrophils 5.7 1.8 - 7.7 K/mcL    Absolute Lymphocytes 2.2 1.0 - 4.0 K/mcL    Absolute Monocytes 1.2 (H) 0.3 - 0.9 K/mcL    Absolute Eosinophils  0.0 0.0 - 0.5 K/mcL    Absolute Basophils 0.0  0.0 - 0.3 K/mcL    Absolute Immature Granulocytes 0.1 0.0 - 0.2 K/mcL       EKG Results         Radiology Results     Imaging Results    None         ED Medication Orders (From admission, onward)    None          ED Course as of 10/04/23 1744   Wed Oct 04, 2023   1735 EKG personally reviewed shows normal sinus rhythm rate of 63 no ST elevation or depression pending formal cardiology interpretation [JW]   2904 I spoke with patient's cardiologist who said that the troponin on outpatient labs was normal. Given CTA PE shows no evidence of right heart strain, and patient hemodynamically stable, patient can follow up closely outpt w/ cardiology and undergo an outpatient echocardiogram.  []      ED Course User Index  [] Destiney Manriquez  [JW] Adrienne Sheikh MD       .Medical Decision Making  87-year-old male presented ER with report of PE on outpatient testing.  No right heart strain on CT scan, will discuss with cardiologist who sent him into the ER he is already anticoagulated with Eliquis.  Patient with normal vital signs here resting comfortably          ED Medications     ED Medication Orders (From admission, onward)    None          Clinical Impression     ED Diagnosis   1. Other acute pulmonary embolism, unspecified whether acute cor pulmonale present (CMD)            Disposition        Current Discharge Medication List      Prior to Admission Medications    Details   trimethoprim (PROLOPRIM) 100 MG tablet Take 100 mg by mouth daily. Indications: Simple Infection of the Urinary Tract      atorvastatin (LIPITOR) 20 MG tablet Take 1 tablet by mouth nightly.  Qty: 60 tablet, Refills: 0      losartan (COZAAR) 50 MG tablet Take 1 tablet by mouth daily.  Qty: 30 tablet, Refills: 0      amLODIPine (NORVASC) 5 MG tablet Take 5 mg by mouth daily. Indications: High Blood Pressure Disorder Do not start before September 16, 2023.      senna (SENOKOT) 8.6 MG tablet Take 1 tablet by mouth daily. Indications: Constipation  symmetric and smooth.    Eyes  Sclera/Conjunctiva - Bilateral - Normal. Pupil - Bilateral - Accommodating, Direct reaction to light normal and Equal.    ENMT  Global Assessment  Upon examination of the ears, nose, mouth and throat - examination of the oral cavity reveals normal lips, teeth, gums and oral mucosa and hard and soft palates, tongue, tonsils and posterior pharynx are moist and symmetric without lesions.    Chest and Lung Exam  Inspection: Shape - Symmetric. Movements - Symmetrical. Accessory muscles - No use of accessory muscles in breathing.  Percussion:  Quality and Intensity: - Percussion normal.  Palpation: - Palpation normal.  Auscultation:  Breath sounds: - Normal and equal bilaterally.  Adventitious sounds: - none bilaterally.    Cardiovascular  Inspection: Carotid artery - Bilateral - Inspection Normal.  Palpation/Percussion: Examination by palpation and percussion reveals - No Thrills.  Cardiac Borders - Normal.  Auscultation: Rhythm - Regular. Rate: Regular.  Heart Sounds - Normal heart sounds, S1 WNL and S2 WNL.  Murmurs & Other Heart Sounds: Auscultation of the heart reveals - No Murmurs or other extra heart sounds.    Abdomen  Inspection: Inspection of the abdomen reveals - No Hernias.  Palpation/Percussion: Palpation and Percussion of the abdomen reveal - Non Tender and No Palpable abdominal  masses.  Liver: - Normal.  Spleen: - Normal.  Auscultation: Auscultation of the abdomen reveals - Bowel sounds normal.  Surgical scars: none    Inguinal and External Genitalia    The patient was examined supine, and standing, with and without Valsalva. There is evidence of a small reducible left inguinal hernia. There is no right inguinal hernia. There may be some minimal fullness at the right external ring, but no outright hernia.  There is no femoral hernia bilaterally. There is no evidence of inguinal or femoral lymphadenopathy bilaterally. The testes are descended bilaterally and symmetric, with no  Do not start before September 16, 2023.      finasteride (PROSCAR) 5 MG tablet Take 5 mg by mouth daily. Indications: Benign Enlargement of Prostate Do not start before September 16, 2023.      latanoprost (XALATAN) 0.005 % ophthalmic solution Place 1 drop into both eyes nightly. Indications: Wide-Angle Glaucoma Do not start before September 16, 2023.      Vitamin D, Ergocalciferol, 1.25 mg (50,000 units) capsule Take 1.25 mg by mouth 1 day a week. Indications: Vitamin D Deficiency Thursday       Klor-Con M 20 MEQ CR tablet Take 20 mEq by mouth daily. Indications: Low Amount of Potassium in the Blood Take with food      polyethylene glycol (MIRALAX) 17 g packet Take 17 g by mouth daily. Indications: Constipation Do not start before September 16, 2023. Stir and dissolve powder in any 4 to 8 ounces of beverage, then drink.              Current Discharge Medication List          There is no disposition no dispo time  There is no comment      Adrienne Sheikh MD   10/4/2023 4:40 PM                      Adrienne Sheikh MD  10/04/23 1749     masses, nodules, or tenderness.      Rectal - Did not examine.    Peripheral Vascular  Lower Extremity: Inspection - Bilateral - No Varicose veins.  Palpation: Edema - Bilateral - No edema.    Musculoskeletal  Global Assessment  Right Upper Extremity - no deformities, masses or tenderness, no known fractures, normal strength and tone and  normal range of motion without pain. Left Upper Extremity - no deformities, masses or tenderness, no known fractures,  normal strength and tone and normal range of motion without pain. Right Lower Extremity - no deformities, masses or  tenderness, no known fractures, normal strength and tone and normal range of motion without pain. Left Lower  Extremity - no deformities, masses or tenderness, no known fractures, normal strength and tone and normal range of  motion without pain.    Lymphatic  Head & Neck  General Head & Neck Lymphatics:  Bilateral: Description - Normal.  Axillary  General Axillary Region:  Bilateral: Description - Normal.  Femoral & Inguinal  Generalized Femoral & Inguinal Lymphatics:  Bilateral: Description - Normal.  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    Assessment/Plan   Mr. Ravi has a small reducible left inguinal hernia.    Recommendation:      In order to prevent symptoms and complications from this hernia, left inguinal herniorrhaphy is indicated and recommended.    Surgical options were explained to the patient.  Options included traditional open repair with mesh, under local anesthesia and IV sedation (MAC anesthesia) , versus a robotic-assisted, transabdominal, laparoscopic repair with mesh under general anesthesia. Both operations are outpatient or ambulatory surgery at [facility].   Educational handouts were given to the patient regarding inguinal hernia repair, from the [employer], and also [employer] (da Aminta robot) Surgery.    The patient is decided to proceed with the robotic laparoscopic method.   We will plan this for Friday, 11/3/2023 at [facility].    I will see the patient for a postoperative visit my [facility] [place] office on Monday, 11/13/2023.    The patient's recent CBC and CMP from 10/6/2023 were essentially normal.  Patient require preoperative EKG.  This will be performed this morning at [facility] [place].    For postoperative analgesia/pain relief, I recommend:     a.  Tylenol Extra Strength 500 mg with ibuprofen 600 mg (three, 200 mg Advil or Motrin tablets ) 4 times a day with food, on schedule, for 2-3 days, then as needed thereafter.      b.  Supplement with Tramadol 50 mg, dispense #9 for more severe or breakthrough pain, as needed.  This has been electronically prescribed to your pharmacy.  Please  this prescription within 7 days of today's visit, or the pharmacist will not fill the prescription.    Inguinal Hernia Consent:  The procedure was explained to the patient in detail, including the risks, benefits and alternatives. The risks include, but are not limited to, infection, bleeding, hematoma, seroma, recurrence of the hernia, injury to local nerves resulting in pain or numbness, injury to the spermatic cord resulting in pain, swelling or atrophy of the testicle (in a male), persistent inguinal pain or chronic pain syndrome and, need for further surgery. The patient agreed with plan and signed consent.

## 2023-10-24 LAB
ATRIAL RATE: 47 BPM
P AXIS: 66 DEGREES
P OFFSET: 170 MS
P ONSET: 113 MS
PR INTERVAL: 180 MS
Q ONSET: 203 MS
QRS COUNT: 8 BEATS
QRS DURATION: 158 MS
QT INTERVAL: 522 MS
QTC CALCULATION(BAZETT): 461 MS
QTC FREDERICIA: 481 MS
R AXIS: 82 DEGREES
T AXIS: 36 DEGREES
T OFFSET: 464 MS
VENTRICULAR RATE: 47 BPM

## 2023-11-03 ENCOUNTER — ANESTHESIA EVENT (OUTPATIENT)
Dept: OPERATING ROOM | Facility: HOSPITAL | Age: 63
End: 2023-11-03
Payer: COMMERCIAL

## 2023-11-03 ENCOUNTER — HOSPITAL ENCOUNTER (OUTPATIENT)
Facility: HOSPITAL | Age: 63
Setting detail: OUTPATIENT SURGERY
Discharge: HOME | End: 2023-11-03
Attending: SURGERY | Admitting: SURGERY
Payer: COMMERCIAL

## 2023-11-03 ENCOUNTER — ANESTHESIA (OUTPATIENT)
Dept: OPERATING ROOM | Facility: HOSPITAL | Age: 63
End: 2023-11-03
Payer: COMMERCIAL

## 2023-11-03 VITALS
HEIGHT: 72 IN | TEMPERATURE: 97 F | RESPIRATION RATE: 16 BRPM | BODY MASS INDEX: 23.47 KG/M2 | HEART RATE: 48 BPM | OXYGEN SATURATION: 100 % | WEIGHT: 173.28 LBS | SYSTOLIC BLOOD PRESSURE: 135 MMHG | DIASTOLIC BLOOD PRESSURE: 78 MMHG

## 2023-11-03 DIAGNOSIS — K40.90 REDUCIBLE LEFT INGUINAL HERNIA: Primary | ICD-10-CM

## 2023-11-03 PROCEDURE — A49650 PR LAP,INGUINAL HERNIA REPR,INITIAL: Performed by: ANESTHESIOLOGY

## 2023-11-03 PROCEDURE — 3600000004 HC OR TIME - INITIAL BASE CHARGE - PROCEDURE LEVEL FOUR: Performed by: SURGERY

## 2023-11-03 PROCEDURE — 2500000005 HC RX 250 GENERAL PHARMACY W/O HCPCS: Performed by: SURGERY

## 2023-11-03 PROCEDURE — 2500000004 HC RX 250 GENERAL PHARMACY W/ HCPCS (ALT 636 FOR OP/ED): Performed by: NURSE ANESTHETIST, CERTIFIED REGISTERED

## 2023-11-03 PROCEDURE — 7100000010 HC PHASE TWO TIME - EACH INCREMENTAL 1 MINUTE: Performed by: SURGERY

## 2023-11-03 PROCEDURE — 3700000001 HC GENERAL ANESTHESIA TIME - INITIAL BASE CHARGE: Performed by: SURGERY

## 2023-11-03 PROCEDURE — 7100000002 HC RECOVERY ROOM TIME - EACH INCREMENTAL 1 MINUTE: Performed by: SURGERY

## 2023-11-03 PROCEDURE — C1781 MESH (IMPLANTABLE): HCPCS | Performed by: SURGERY

## 2023-11-03 PROCEDURE — 7100000001 HC RECOVERY ROOM TIME - INITIAL BASE CHARGE: Performed by: SURGERY

## 2023-11-03 PROCEDURE — 2500000005 HC RX 250 GENERAL PHARMACY W/O HCPCS: Performed by: NURSE ANESTHETIST, CERTIFIED REGISTERED

## 2023-11-03 PROCEDURE — 2780000003 HC OR 278 NO HCPCS: Performed by: SURGERY

## 2023-11-03 PROCEDURE — 49650 LAP ING HERNIA REPAIR INIT: CPT | Performed by: SURGERY

## 2023-11-03 PROCEDURE — 2500000004 HC RX 250 GENERAL PHARMACY W/ HCPCS (ALT 636 FOR OP/ED): Performed by: SURGERY

## 2023-11-03 PROCEDURE — 2720000007 HC OR 272 NO HCPCS: Performed by: SURGERY

## 2023-11-03 PROCEDURE — 3700000002 HC GENERAL ANESTHESIA TIME - EACH INCREMENTAL 1 MINUTE: Performed by: SURGERY

## 2023-11-03 PROCEDURE — 7100000009 HC PHASE TWO TIME - INITIAL BASE CHARGE: Performed by: SURGERY

## 2023-11-03 PROCEDURE — A49650 PR LAP,INGUINAL HERNIA REPR,INITIAL: Performed by: NURSE ANESTHETIST, CERTIFIED REGISTERED

## 2023-11-03 PROCEDURE — 3600000009 HC OR TIME - EACH INCREMENTAL 1 MINUTE - PROCEDURE LEVEL FOUR: Performed by: SURGERY

## 2023-11-03 PROCEDURE — A4217 STERILE WATER/SALINE, 500 ML: HCPCS | Performed by: SURGERY

## 2023-11-03 DEVICE — LAPAROSCOPIC SELF-FIXATING MESH POLYESTER WITH POLYLACTIC ACID GRIPS AND COLLAGEN FILM
Type: IMPLANTABLE DEVICE | Site: INGUINAL | Status: FUNCTIONAL
Brand: PROGRIP

## 2023-11-03 RX ORDER — GLYCOPYRROLATE 0.2 MG/ML
INJECTION INTRAMUSCULAR; INTRAVENOUS AS NEEDED
Status: DISCONTINUED | OUTPATIENT
Start: 2023-11-03 | End: 2023-11-03

## 2023-11-03 RX ORDER — FENTANYL CITRATE 50 UG/ML
INJECTION, SOLUTION INTRAMUSCULAR; INTRAVENOUS AS NEEDED
Status: DISCONTINUED | OUTPATIENT
Start: 2023-11-03 | End: 2023-11-03

## 2023-11-03 RX ORDER — LABETALOL HYDROCHLORIDE 5 MG/ML
5 INJECTION, SOLUTION INTRAVENOUS ONCE AS NEEDED
Status: DISCONTINUED | OUTPATIENT
Start: 2023-11-03 | End: 2023-11-03 | Stop reason: HOSPADM

## 2023-11-03 RX ORDER — SODIUM CHLORIDE, SODIUM LACTATE, POTASSIUM CHLORIDE, CALCIUM CHLORIDE 600; 310; 30; 20 MG/100ML; MG/100ML; MG/100ML; MG/100ML
100 INJECTION, SOLUTION INTRAVENOUS CONTINUOUS
Status: DISCONTINUED | OUTPATIENT
Start: 2023-11-03 | End: 2023-11-03 | Stop reason: HOSPADM

## 2023-11-03 RX ORDER — KETOROLAC TROMETHAMINE 30 MG/ML
INJECTION, SOLUTION INTRAMUSCULAR; INTRAVENOUS AS NEEDED
Status: DISCONTINUED | OUTPATIENT
Start: 2023-11-03 | End: 2023-11-03

## 2023-11-03 RX ORDER — ROCURONIUM BROMIDE 10 MG/ML
INJECTION, SOLUTION INTRAVENOUS AS NEEDED
Status: DISCONTINUED | OUTPATIENT
Start: 2023-11-03 | End: 2023-11-03

## 2023-11-03 RX ORDER — PROPOFOL 10 MG/ML
INJECTION, EMULSION INTRAVENOUS AS NEEDED
Status: DISCONTINUED | OUTPATIENT
Start: 2023-11-03 | End: 2023-11-03

## 2023-11-03 RX ORDER — MEPERIDINE HYDROCHLORIDE 25 MG/ML
12.5 INJECTION INTRAMUSCULAR; INTRAVENOUS; SUBCUTANEOUS EVERY 10 MIN PRN
Status: DISCONTINUED | OUTPATIENT
Start: 2023-11-03 | End: 2023-11-03 | Stop reason: HOSPADM

## 2023-11-03 RX ORDER — ONDANSETRON HYDROCHLORIDE 2 MG/ML
INJECTION, SOLUTION INTRAVENOUS AS NEEDED
Status: DISCONTINUED | OUTPATIENT
Start: 2023-11-03 | End: 2023-11-03

## 2023-11-03 RX ORDER — ALBUTEROL SULFATE 0.83 MG/ML
2.5 SOLUTION RESPIRATORY (INHALATION) ONCE AS NEEDED
Status: DISCONTINUED | OUTPATIENT
Start: 2023-11-03 | End: 2023-11-03 | Stop reason: HOSPADM

## 2023-11-03 RX ORDER — WATER 1 ML/ML
IRRIGANT IRRIGATION AS NEEDED
Status: DISCONTINUED | OUTPATIENT
Start: 2023-11-03 | End: 2023-11-03 | Stop reason: HOSPADM

## 2023-11-03 RX ORDER — NORETHINDRONE AND ETHINYL ESTRADIOL 0.5-0.035
KIT ORAL AS NEEDED
Status: DISCONTINUED | OUTPATIENT
Start: 2023-11-03 | End: 2023-11-03

## 2023-11-03 RX ORDER — DEXAMETHASONE SODIUM PHOSPHATE 4 MG/ML
INJECTION, SOLUTION INTRA-ARTICULAR; INTRALESIONAL; INTRAMUSCULAR; INTRAVENOUS; SOFT TISSUE AS NEEDED
Status: DISCONTINUED | OUTPATIENT
Start: 2023-11-03 | End: 2023-11-03

## 2023-11-03 RX ORDER — ONDANSETRON HYDROCHLORIDE 2 MG/ML
4 INJECTION, SOLUTION INTRAVENOUS ONCE AS NEEDED
Status: DISCONTINUED | OUTPATIENT
Start: 2023-11-03 | End: 2023-11-03 | Stop reason: HOSPADM

## 2023-11-03 RX ORDER — MIDAZOLAM HYDROCHLORIDE 1 MG/ML
1 INJECTION, SOLUTION INTRAMUSCULAR; INTRAVENOUS ONCE AS NEEDED
Status: DISCONTINUED | OUTPATIENT
Start: 2023-11-03 | End: 2023-11-03 | Stop reason: HOSPADM

## 2023-11-03 RX ORDER — MIDAZOLAM HYDROCHLORIDE 1 MG/ML
INJECTION, SOLUTION INTRAMUSCULAR; INTRAVENOUS AS NEEDED
Status: DISCONTINUED | OUTPATIENT
Start: 2023-11-03 | End: 2023-11-03

## 2023-11-03 RX ORDER — BUPIVACAINE HYDROCHLORIDE 5 MG/ML
INJECTION, SOLUTION PERINEURAL AS NEEDED
Status: DISCONTINUED | OUTPATIENT
Start: 2023-11-03 | End: 2023-11-03 | Stop reason: HOSPADM

## 2023-11-03 RX ORDER — LIDOCAINE HCL/PF 100 MG/5ML
SYRINGE (ML) INTRAVENOUS AS NEEDED
Status: DISCONTINUED | OUTPATIENT
Start: 2023-11-03 | End: 2023-11-03

## 2023-11-03 RX ORDER — HYDRALAZINE HYDROCHLORIDE 20 MG/ML
5 INJECTION INTRAMUSCULAR; INTRAVENOUS EVERY 30 MIN PRN
Status: DISCONTINUED | OUTPATIENT
Start: 2023-11-03 | End: 2023-11-03 | Stop reason: HOSPADM

## 2023-11-03 RX ORDER — ACETAMINOPHEN 325 MG/1
650 TABLET ORAL EVERY 4 HOURS PRN
Status: DISCONTINUED | OUTPATIENT
Start: 2023-11-03 | End: 2023-11-03 | Stop reason: HOSPADM

## 2023-11-03 RX ORDER — LIDOCAINE HYDROCHLORIDE 10 MG/ML
0.1 INJECTION, SOLUTION EPIDURAL; INFILTRATION; INTRACAUDAL; PERINEURAL ONCE
Status: DISCONTINUED | OUTPATIENT
Start: 2023-11-03 | End: 2023-11-03 | Stop reason: HOSPADM

## 2023-11-03 RX ADMIN — SUGAMMADEX 200 MG: 100 INJECTION, SOLUTION INTRAVENOUS at 11:53

## 2023-11-03 RX ADMIN — FENTANYL CITRATE 50 MCG: 50 INJECTION, SOLUTION INTRAMUSCULAR; INTRAVENOUS at 11:06

## 2023-11-03 RX ADMIN — PROPOFOL 200 MG: 10 INJECTION, EMULSION INTRAVENOUS at 10:05

## 2023-11-03 RX ADMIN — FENTANYL CITRATE 50 MCG: 50 INJECTION, SOLUTION INTRAMUSCULAR; INTRAVENOUS at 10:05

## 2023-11-03 RX ADMIN — ROCURONIUM BROMIDE 50 MG: 10 INJECTION, SOLUTION INTRAVENOUS at 10:05

## 2023-11-03 RX ADMIN — EPHEDRINE SULFATE 5 MG: 50 INJECTION, SOLUTION INTRAVENOUS at 10:47

## 2023-11-03 RX ADMIN — MIDAZOLAM 2 MG: 1 INJECTION INTRAMUSCULAR; INTRAVENOUS at 10:03

## 2023-11-03 RX ADMIN — KETOROLAC TROMETHAMINE 15 MG: 30 INJECTION, SOLUTION INTRAMUSCULAR at 11:42

## 2023-11-03 RX ADMIN — DEXAMETHASONE SODIUM PHOSPHATE 4 MG: 4 INJECTION, SOLUTION INTRAMUSCULAR; INTRAVENOUS at 10:18

## 2023-11-03 RX ADMIN — ROCURONIUM BROMIDE 10 MG: 10 INJECTION, SOLUTION INTRAVENOUS at 11:28

## 2023-11-03 RX ADMIN — EPHEDRINE SULFATE 5 MG: 50 INJECTION, SOLUTION INTRAVENOUS at 10:21

## 2023-11-03 RX ADMIN — LIDOCAINE HYDROCHLORIDE 100 MG: 20 INJECTION INTRAVENOUS at 10:05

## 2023-11-03 RX ADMIN — FENTANYL CITRATE 50 MCG: 50 INJECTION, SOLUTION INTRAMUSCULAR; INTRAVENOUS at 10:29

## 2023-11-03 RX ADMIN — GLYCOPYRROLATE 0.2 MG: 0.2 INJECTION, SOLUTION INTRAMUSCULAR; INTRAVENOUS at 10:47

## 2023-11-03 RX ADMIN — ONDANSETRON 4 MG: 2 INJECTION INTRAMUSCULAR; INTRAVENOUS at 11:42

## 2023-11-03 RX ADMIN — EPHEDRINE SULFATE 5 MG: 50 INJECTION, SOLUTION INTRAVENOUS at 10:35

## 2023-11-03 RX ADMIN — ROCURONIUM BROMIDE 20 MG: 10 INJECTION, SOLUTION INTRAVENOUS at 10:48

## 2023-11-03 RX ADMIN — SODIUM CHLORIDE, POTASSIUM CHLORIDE, SODIUM LACTATE AND CALCIUM CHLORIDE 100 ML/HR: 600; 310; 30; 20 INJECTION, SOLUTION INTRAVENOUS at 09:27

## 2023-11-03 RX ADMIN — ROCURONIUM BROMIDE 20 MG: 10 INJECTION, SOLUTION INTRAVENOUS at 11:09

## 2023-11-03 RX ADMIN — SODIUM CHLORIDE, POTASSIUM CHLORIDE, SODIUM LACTATE AND CALCIUM CHLORIDE: 600; 310; 30; 20 INJECTION, SOLUTION INTRAVENOUS at 09:58

## 2023-11-03 SDOH — HEALTH STABILITY: MENTAL HEALTH: CURRENT SMOKER: 0

## 2023-11-03 ASSESSMENT — PAIN - FUNCTIONAL ASSESSMENT
PAIN_FUNCTIONAL_ASSESSMENT: 0-10

## 2023-11-03 ASSESSMENT — PAIN SCALES - GENERAL
PAINLEVEL_OUTOF10: 2
PAINLEVEL_OUTOF10: 0 - NO PAIN
PAINLEVEL_OUTOF10: 0 - NO PAIN
PAIN_LEVEL: 0
PAINLEVEL_OUTOF10: 0 - NO PAIN
PAINLEVEL_OUTOF10: 2

## 2023-11-03 NOTE — ANESTHESIA PREPROCEDURE EVALUATION
Patient: Facundo Ravi    Procedure Information       Date/Time: 11/03/23 1030    Procedure: ROBOTIC ASSISTED LAPAROSCOPIC TRANSABDOMINAL REDUCIBLE LEFT POSSIBLE RIGHT INGUINAL HERNIA REPAIR WITH MESH (Left) - Robotic-assisted, transabdominal, laparoscopic repair of reducible left inguinal hernia with mesh; possible right    Location: [facility] 09 / [facility]    Surgeons: Luiz Davalos MD            Relevant Problems   Cardiovascular   (+) Hypertension   (+) MVP (mitral valve prolapse)   (+) Mitral valve regurgitation congenital      Musculoskeletal   (+) Lumbar spinal stenosis       Clinical information reviewed:   Tobacco  Allergies  Meds   Med Hx  Surg Hx   Fam Hx  Soc Hx        NPO Detail:  NPO/Void Status  Carbonhydrate Drink Given Prior to Surgery? : N  Date of Last Liquid: 11/02/23  Time of Last Liquid: 2100  Date of Last Solid: 11/02/23  Time of Last Solid: 2100         Physical Exam    Airway  Mallampati: II  TM distance: >3 FB  Neck ROM: full     Cardiovascular   Rhythm: regular  Rate: normal     Dental    Pulmonary   Breath sounds clear to auscultation     Abdominal        Anesthesia Plan    ASA 3     general     The patient is not a current smoker.  Patient was not previously instructed to abstain from smoking on day of procedure.  Patient did not smoke on day of procedure.    intravenous induction   Postoperative administration of opioids is intended.  Anesthetic plan and risks discussed with patient.  Use of blood products discussed with patient who consented to blood products.    Plan discussed with CRNA.

## 2023-11-03 NOTE — ANESTHESIA POSTPROCEDURE EVALUATION
Patient: Facundo Ravi    Procedure Summary       Date: 11/03/23 Room / Location: [facility] / [facility]    Anesthesia Start: 0958 Anesthesia Stop:     Procedure: ROBOTIC ASSISTED LAPAROSCOPIC BILATERAL TRANSABDOMINAL REDUCIBLE INGUINAL HERNIA REPAIRS WITH MESH (Bilateral) Diagnosis:       Reducible left inguinal hernia      (Reducible left inguinal hernia [K40.90])    Surgeons: Luiz Davalos MD Responsible Provider: Brendon Cohen MD    Anesthesia Type: general ASA Status: 3            Anesthesia Type: general    Vitals Value Taken Time   /74 11/03/23 1207   Temp 36.4 11/03/23 1207   Pulse 56 11/03/23 1207   Resp 15 11/03/23 1207   SpO2 100 11/03/23 1207       Anesthesia Post Evaluation    Patient location during evaluation: PACU  Patient participation: complete - patient participated  Level of consciousness: sleepy but conscious  Pain score: 0  Pain management: adequate  Airway patency: patent  Cardiovascular status: acceptable  Respiratory status: acceptable and nasal cannula  Hydration status: acceptable        No notable events documented.

## 2023-11-03 NOTE — OP NOTE
ROBOTIC ASSISTED LAPAROSCOPIC TRANSABDOMINAL REDUCIBLE LEFT POSSIBLE RIGHT INGUINAL HERNIA REPAIR WITH MESH (L) Operative Note     Date: 11/3/2023  OR Location: [facility]    Name: Facundo Ravi, : 1960, Age: 63 y.o., MRN: [id number], Sex: male    Diagnosis  Pre-op Diagnosis     * Reducible left inguinal hernia [K40.90] Bilateral reducible indirect inguinal hernias     Procedures  Robotic assisted, transabdominal, laparoscopic repair of bilateral reducible inguinal hernias, with mesh    Surgeons      * Luiz Davalos - Primary    Resident/Fellow/Other Assistant:  Surgeon(s) and Role: Vik Cary SA; RHYS Fernández    Procedure Summary  Anesthesia: General  ASA: III  Anesthesia Staff: Anesthesiologist: Brendon Cohen MD  CRNA: SHARAD Canada-CRNA  Estimated Blood Loss: < 5mL  Intra-op Medications: * No intraprocedure medications in log *        Specimen: No specimens collected     Staff:   Circulator: Olga Garcia RN  Scrub Person: Amber Robins         Drains and/or Catheters: * None in log *    Tourniquet Times:         Implants:  Implants              Findings: The patient had a moderate sized reducible indirect left inguinal hernia with no direct component; there was a small indirect occult right inguinal hernia with no direct component on the right side.    Indications: Facundo Ravi is an 63 y.o. male who is having surgery for Reducible left inguinal hernia [K40.90].     The patient was seen in the preoperative area. The risks, benefits, complications, treatment options, non-operative alternatives, expected recovery and outcomes were discussed with the patient. The possibilities of reaction to medication, pulmonary aspiration, injury to surrounding structures, bleeding, recurrent infection, the need for additional procedures, failure to diagnose a condition, and creating a complication requiring transfusion or operation were discussed with the patient. The patient  concurred with the proposed plan, giving informed consent.  The site of surgery was properly noted/marked if necessary per policy. The patient has been actively warmed in preoperative area. Preoperative antibiotics are not indicated. Venous thrombosis prophylaxis have been ordered including bilateral sequential compression devices    Procedure Details:   Findings: See above    Specimen:  none    Procedure: The patient was taken to the operating room placed on the table in the supine position.  Preoperative huddle was accomplished with the OR team and the patient.  Satisfactory general endotracheal anesthesia was achieved.  A Turner catheter was placed sterilely in the urinary bladder and this was removed at the conclusion of the case. The arms were tucked. The abdomen was widely prepared and draped in the normal sterile fashion. After the appropriate timeout, the case commenced.    Just superior to the umbilicus, a 1.5 - 2 cm longitudinal incision was made. The skin and subcutaneous tissues were divided down to the midline abdominal fascia. A stay suture with 0-Vicryl was placed superiorly in the fascia and the fascia was elevated. An 8 mm longitudinal incision was made in the fascia and access was obtained to the peritoneal cavity. An 8 mm trocar by [employer] was then placed through this fascial defect. Pneumoperitoneum achieved in the normal fashion. The patient was placed in Trendelenburg position. The laparoscope was inserted. Under direct vision, similar 8mm trochars are placed in the upper quadrants bilaterally in the standard fashion and positioning.    The da Aminta XI robot by [employer] was then docked to the 3 trochars, from the patient's right, in the standard fashion.  The surgeon then prepared the mesh. A 15 x 10 cm piece of Progrip mesh by [employer] was used.  This was trimmed to a 14 x 10 cm, the corners were curved and the mesh was folded and marked in the standard fashion for placement.  At this point,  the surgeon scrubbed out of the case and went to the console to complete the operation.    The above findings were noted. The right side was approached first. Four cm superior to the defect medially, adjacent to the medial umbilical ligament, the peritoneum was scored from a medial to lateral direction. Following this, using careful and meticulous dissection, a wide inferior flap was created, carefully dissecting free and fully reducing the hernia sac. The entire myopectoneal fossa was exposed. The spermatic cord was completely parietalized. The Naseem's ligament medially and inferiorly was exposed and identified.    After complete dissection, the above-described piece of mesh was placed, covering the entire myopectineal fossa, with the self-affixing side towards the muscle and fascia, and the smooth side toward the peritoneum. Following this, the peritoneum was closed using in a running fashion, using a barbed 2-0 V-Loc suture. This completed the repair.    The left side was then approached and completed in the identical fashion.    Following this, robot was undocked, and the trochars were removed under direct vision.  Pneumoperitoneum was evacuated.  The patient was taken out of Trendelenburg position. The supraumbilical fascial defect was approximated using figure-of-eight 0 Vicryl sutures. The subcutaneous tissues at each incision were approximated as a interrupted 3-0 Vicryl sutures. The skin at each incision was approximated using running 4-0 undyed subcuticular Vicryl sutures. The wounds were cleaned and dried, benzoin and Steri-Strips were placed, followed by dry sterile Bioclusive dressings.    The patient tolerated the procedure well. The sponge needle and instrument counts were correct ×2. Total IVF's were 1000 cc of crystalloid. Blood loss was less than 5 cc. Urine output was 100 cc over 70 minutes. The patient was extubated in the operating room and taken to the PACU with stable vital signs and in  excellent condition.    Luiz Davalos M.D.    Complications:  None; patient tolerated the procedure well.    Disposition: PACU - hemodynamically stable.  Condition: stable         Additional Details: none    Attending Attestation:     Luiz Davalos  Phone Number: [phone]

## 2023-11-03 NOTE — ANESTHESIA PROCEDURE NOTES
Airway  Date/Time: 11/3/2023 10:08 AM  Urgency: elective    Airway not difficult    Staffing  Performed: CRNA   Authorized by: Brendon Cohen MD    Performed by: SHARAD Canada-GAGE  Patient location during procedure: OR    Indications and Patient Condition  Indications for airway management: anesthesia and airway protection  Spontaneous ventilation: present  Sedation level: deep  Preoxygenated: yes  Patient position: sniffing  MILS maintained throughout  Mask difficulty assessment: 1 - vent by mask  Planned trial extubation    Final Airway Details  Final airway type: endotracheal airway      Successful airway: ETT  Cuffed: yes   Successful intubation technique: direct laryngoscopy  Facilitating devices/methods: intubating stylet and cricoid pressure  Endotracheal tube insertion site: oral  Blade: Mckay  Blade size: #4  Number of attempts at approach: 2  Number of other approaches attempted: 0

## 2023-11-06 DIAGNOSIS — I10 PRIMARY HYPERTENSION: Primary | ICD-10-CM

## 2023-11-06 DIAGNOSIS — R93.1 AGATSTON CAC SCORE, >400: ICD-10-CM

## 2023-11-06 RX ORDER — HYDROCHLOROTHIAZIDE 25 MG/1
25 TABLET ORAL DAILY
Qty: 90 TABLET | Refills: 3 | Status: SHIPPED | OUTPATIENT
Start: 2023-11-06

## 2023-11-06 RX ORDER — ATORVASTATIN CALCIUM 10 MG/1
10 TABLET, FILM COATED ORAL DAILY
Qty: 90 TABLET | Refills: 3 | Status: SHIPPED | OUTPATIENT
Start: 2023-11-06 | End: 2024-11-05

## 2023-11-10 NOTE — PROGRESS NOTES
Post-Operative Office Visit  Facundo Ravi presents for his postoperative visit.  On 1/3/2023, the patient underwent a robotic-assisted, laparoscopic repair of bilateral reducible inguinal hernias, with mesh.  Please see the operative note for details.  These were indirect hernias, moderate on the left and small on the right.  There was no pathologic specimen.    Unremarkable postoperative course.  He used Excedrin postoperatively twice daily x1 day, and daily times an additional 2 days.  He will use one of the 9 tramadol.  He notes some mild bruising around the umbilical incision.  Notes mild soreness in the groin.  He had constipation for 4 days postoperatively relieved by MiraLAX.    Vitals  There were no vitals taken for this visit.     Exam    Post Op Abdominal Physical Exam    The physical exam findings are as follows:    General  General Appearance - Not in acute distress.    Integumentary  Abdominal Incision - laparoscopic times 3, upper abdomen  - Dry, Intact, No evidence of infection, hematoma, or seroma, edges well-approximated.  No drainage present, no redness and no warmth to the touch.  Minimal resolving ecchymosis at the umbilicus extending about 2 cm.  Steri-Strips removed.    Chest and Lung Exam  Auscultation:  Breath sounds: - Normal and equal bilaterally.    Adventitious sounds: none    Cardiovascular  Auscultation: Rhythm - Regular. Rate - Regular.  Heart Sounds - Normal heart sounds.    Abdomen  Inspection: - Inspection Normal.  Palpation/Percussion: Palpation and Percussion of the abdomen reveal - Non Tender, No Rebound tenderness, No Rigidity (guarding) and No Palpable abdominal masses.  Liver: - Normal.  Spleen: - Normal.  Auscultation: Auscultation of the abdomen reveals - Bowel sounds normal and No Abdominal bruits.  Surgical scars:  laparoscopic times 3, upper abdomen    Inguinal and External Genitalia  The testes are descended bilaterally and symmetric and normal.  There is no hematoma  or seroma or edema in the scrotum, along the spermatic cord, or around the testicle. The spermatic cords are normal bilaterally. The hernia repairs are intact.      Assessment and Plan    Mr. Ravi has done very well postoperatively.      Recommendations:    Massage moisturizing cream or ointment along incisions daily for 10-14 days to help soften    May resume stationary bike now and other aerobic activity    May do light upper extremity weights    May resume full activity including basketball, planks, core exercises, pull-ups push-ups etc. 4 weeks postoperative, Friday, 12/1/2023    Follow-up as needed

## 2023-11-13 ENCOUNTER — OFFICE VISIT (OUTPATIENT)
Dept: SURGERY | Facility: CLINIC | Age: 63
End: 2023-11-13
Payer: COMMERCIAL

## 2023-11-13 VITALS
HEART RATE: 70 BPM | SYSTOLIC BLOOD PRESSURE: 150 MMHG | OXYGEN SATURATION: 98 % | BODY MASS INDEX: 23.03 KG/M2 | DIASTOLIC BLOOD PRESSURE: 90 MMHG | HEIGHT: 72 IN | WEIGHT: 170 LBS

## 2023-11-13 DIAGNOSIS — K40.20 NON-RECURRENT BILATERAL INGUINAL HERNIA WITHOUT OBSTRUCTION OR GANGRENE: Primary | ICD-10-CM

## 2023-11-13 PROCEDURE — 99024 POSTOP FOLLOW-UP VISIT: CPT | Performed by: SURGERY

## 2023-11-13 PROCEDURE — 3077F SYST BP >= 140 MM HG: CPT | Performed by: SURGERY

## 2023-11-13 PROCEDURE — 1036F TOBACCO NON-USER: CPT | Performed by: SURGERY

## 2023-11-13 PROCEDURE — 3080F DIAST BP >= 90 MM HG: CPT | Performed by: SURGERY

## 2023-11-13 ASSESSMENT — PAIN SCALES - GENERAL: PAINLEVEL: 0-NO PAIN

## 2024-04-12 ENCOUNTER — OFFICE VISIT (OUTPATIENT)
Dept: PRIMARY CARE | Facility: CLINIC | Age: 64
End: 2024-04-12
Payer: COMMERCIAL

## 2024-04-12 VITALS
DIASTOLIC BLOOD PRESSURE: 76 MMHG | TEMPERATURE: 97.3 F | WEIGHT: 171 LBS | OXYGEN SATURATION: 98 % | HEIGHT: 72 IN | HEART RATE: 60 BPM | SYSTOLIC BLOOD PRESSURE: 128 MMHG | BODY MASS INDEX: 23.16 KG/M2

## 2024-04-12 DIAGNOSIS — I10 PRIMARY HYPERTENSION: Primary | ICD-10-CM

## 2024-04-12 DIAGNOSIS — R93.1 AGATSTON CAC SCORE, >400: ICD-10-CM

## 2024-04-12 PROBLEM — Q23.3: Status: RESOLVED | Noted: 2023-10-04 | Resolved: 2024-04-12

## 2024-04-12 PROCEDURE — 99213 OFFICE O/P EST LOW 20 MIN: CPT | Performed by: INTERNAL MEDICINE

## 2024-04-12 PROCEDURE — 1036F TOBACCO NON-USER: CPT | Performed by: INTERNAL MEDICINE

## 2024-04-12 PROCEDURE — 3074F SYST BP LT 130 MM HG: CPT | Performed by: INTERNAL MEDICINE

## 2024-04-12 PROCEDURE — 3078F DIAST BP <80 MM HG: CPT | Performed by: INTERNAL MEDICINE

## 2024-04-12 RX ORDER — FLUOCINONIDE TOPICAL SOLUTION USP, 0.05% 0.5 MG/ML
SOLUTION TOPICAL
COMMUNITY
Start: 2024-03-25

## 2024-04-12 ASSESSMENT — PATIENT HEALTH QUESTIONNAIRE - PHQ9
SUM OF ALL RESPONSES TO PHQ9 QUESTIONS 1 & 2: 0
2. FEELING DOWN, DEPRESSED OR HOPELESS: NOT AT ALL
1. LITTLE INTEREST OR PLEASURE IN DOING THINGS: NOT AT ALL

## 2024-04-12 NOTE — PROGRESS NOTES
Subjective   Patient ID: Facundo Ravi is a 63 y.o. male who presents for Follow-up.    HPI   Had inguinal hernia surgery.  Had colonoscopy.    Bps have been ~130/80 at home. No dizziness, HA, CP.  Tolerating statin.      Review of Systems    Objective   /76   Pulse 60   Temp 36.3 °C (97.3 °F)   Ht 1.829 m (6')   Wt 77.6 kg (171 lb)   SpO2 98%   BMI 23.19 kg/m²     Physical Exam  Constitutional:       Appearance: Normal appearance.   Cardiovascular:      Rate and Rhythm: Normal rate and regular rhythm.      Heart sounds: No murmur heard.  Pulmonary:      Effort: Pulmonary effort is normal.      Breath sounds: Normal breath sounds.   Musculoskeletal:      Right lower leg: No edema.      Left lower leg: No edema.   Neurological:      General: No focal deficit present.      Mental Status: He is alert.      Gait: Gait normal.         Assessment/Plan   Problem List Items Addressed This Visit             ICD-10-CM    Agatston CAC score, >400 R93.1    Hypertension - Primary I10          HTN  -Continue current med. Advised to start checking BP at home, call if >130/80 consistently.     CAC score >500 in 2021  -Continue statin     L knee OA - stable     Health maintenance:  -Colon cancer screening - Colonoscopy 1/2024 with Dr Vollweiler, normal, due in 5 years due to fam hx.   -Prostate - PSA nml 10/2023  -HCV neg  -Immunizations    -Pneumonia -Had pneumovax, will need series at age 64   -Shingrix UTD   -Covid booster declined   -Flu - today     Follow up 6 months CPE

## 2024-09-08 ASSESSMENT — PROMIS GLOBAL HEALTH SCALE
RATE_AVERAGE_PAIN: 1
RATE_GENERAL_HEALTH: VERY GOOD
CARRYOUT_PHYSICAL_ACTIVITIES: COMPLETELY
RATE_MENTAL_HEALTH: VERY GOOD
CARRYOUT_SOCIAL_ACTIVITIES: VERY GOOD
RATE_PHYSICAL_HEALTH: VERY GOOD
RATE_QUALITY_OF_LIFE: VERY GOOD
EMOTIONAL_PROBLEMS: NEVER
RATE_SOCIAL_SATISFACTION: VERY GOOD

## 2024-09-12 ENCOUNTER — APPOINTMENT (OUTPATIENT)
Dept: PRIMARY CARE | Facility: CLINIC | Age: 64
End: 2024-09-12
Payer: COMMERCIAL

## 2024-09-12 ENCOUNTER — LAB (OUTPATIENT)
Dept: LAB | Facility: LAB | Age: 64
End: 2024-09-12
Payer: COMMERCIAL

## 2024-09-12 VITALS
DIASTOLIC BLOOD PRESSURE: 79 MMHG | WEIGHT: 168 LBS | HEIGHT: 72 IN | SYSTOLIC BLOOD PRESSURE: 132 MMHG | BODY MASS INDEX: 22.75 KG/M2 | OXYGEN SATURATION: 98 % | HEART RATE: 48 BPM

## 2024-09-12 DIAGNOSIS — Z00.00 ANNUAL PHYSICAL EXAM: Primary | ICD-10-CM

## 2024-09-12 DIAGNOSIS — Z23 NEED FOR INFLUENZA VACCINATION: ICD-10-CM

## 2024-09-12 DIAGNOSIS — Z23 NEED FOR PNEUMOCOCCAL 20-VALENT CONJUGATE VACCINATION: ICD-10-CM

## 2024-09-12 DIAGNOSIS — Z00.00 ANNUAL PHYSICAL EXAM: ICD-10-CM

## 2024-09-12 DIAGNOSIS — R93.1 AGATSTON CAC SCORE, >400: ICD-10-CM

## 2024-09-12 DIAGNOSIS — I10 PRIMARY HYPERTENSION: ICD-10-CM

## 2024-09-12 PROBLEM — K40.90 INGUINAL HERNIA: Status: RESOLVED | Noted: 2023-10-04 | Resolved: 2024-09-12

## 2024-09-12 PROBLEM — K40.90 REDUCIBLE LEFT INGUINAL HERNIA: Status: RESOLVED | Noted: 2023-10-23 | Resolved: 2024-09-12

## 2024-09-12 PROBLEM — M54.50 INTERMITTENT LOW BACK PAIN: Status: RESOLVED | Noted: 2023-10-04 | Resolved: 2024-09-12

## 2024-09-12 LAB
ALBUMIN SERPL BCP-MCNC: 4.6 G/DL (ref 3.4–5)
ALP SERPL-CCNC: 52 U/L (ref 33–136)
ALT SERPL W P-5'-P-CCNC: 19 U/L (ref 10–52)
ANION GAP SERPL CALC-SCNC: 11 MMOL/L (ref 10–20)
APPEARANCE UR: CLEAR
AST SERPL W P-5'-P-CCNC: 31 U/L (ref 9–39)
BILIRUB SERPL-MCNC: 1.1 MG/DL (ref 0–1.2)
BILIRUB UR STRIP.AUTO-MCNC: NEGATIVE MG/DL
BUN SERPL-MCNC: 20 MG/DL (ref 6–23)
CALCIUM SERPL-MCNC: 9.9 MG/DL (ref 8.6–10.6)
CHLORIDE SERPL-SCNC: 99 MMOL/L (ref 98–107)
CHOLEST SERPL-MCNC: 161 MG/DL (ref 0–199)
CHOLESTEROL/HDL RATIO: 2.4
CO2 SERPL-SCNC: 31 MMOL/L (ref 21–32)
COLOR UR: NORMAL
CREAT SERPL-MCNC: 1.09 MG/DL (ref 0.5–1.3)
EGFRCR SERPLBLD CKD-EPI 2021: 75 ML/MIN/1.73M*2
ERYTHROCYTE [DISTWIDTH] IN BLOOD BY AUTOMATED COUNT: 12.6 % (ref 11.5–14.5)
GLUCOSE SERPL-MCNC: 93 MG/DL (ref 74–99)
GLUCOSE UR STRIP.AUTO-MCNC: NORMAL MG/DL
HCT VFR BLD AUTO: 43.7 % (ref 41–52)
HDLC SERPL-MCNC: 65.8 MG/DL
HGB BLD-MCNC: 14.4 G/DL (ref 13.5–17.5)
KETONES UR STRIP.AUTO-MCNC: NEGATIVE MG/DL
LDLC SERPL CALC-MCNC: 80 MG/DL
LEUKOCYTE ESTERASE UR QL STRIP.AUTO: NEGATIVE
MCH RBC QN AUTO: 29.6 PG (ref 26–34)
MCHC RBC AUTO-ENTMCNC: 33 G/DL (ref 32–36)
MCV RBC AUTO: 90 FL (ref 80–100)
NITRITE UR QL STRIP.AUTO: NEGATIVE
NON HDL CHOLESTEROL: 95 MG/DL (ref 0–149)
NRBC BLD-RTO: 0 /100 WBCS (ref 0–0)
PH UR STRIP.AUTO: 5 [PH]
PLATELET # BLD AUTO: 215 X10*3/UL (ref 150–450)
POTASSIUM SERPL-SCNC: 3.8 MMOL/L (ref 3.5–5.3)
PROT SERPL-MCNC: 7.1 G/DL (ref 6.4–8.2)
PROT UR STRIP.AUTO-MCNC: NEGATIVE MG/DL
PSA SERPL-MCNC: 0.71 NG/ML
RBC # BLD AUTO: 4.86 X10*6/UL (ref 4.5–5.9)
RBC # UR STRIP.AUTO: NEGATIVE /UL
SODIUM SERPL-SCNC: 137 MMOL/L (ref 136–145)
SP GR UR STRIP.AUTO: 1.02
TRIGL SERPL-MCNC: 74 MG/DL (ref 0–149)
UROBILINOGEN UR STRIP.AUTO-MCNC: NORMAL MG/DL
VLDL: 15 MG/DL (ref 0–40)
WBC # BLD AUTO: 5 X10*3/UL (ref 4.4–11.3)

## 2024-09-12 PROCEDURE — 80061 LIPID PANEL: CPT

## 2024-09-12 PROCEDURE — 99214 OFFICE O/P EST MOD 30 MIN: CPT | Performed by: INTERNAL MEDICINE

## 2024-09-12 PROCEDURE — 84153 ASSAY OF PSA TOTAL: CPT

## 2024-09-12 PROCEDURE — 3008F BODY MASS INDEX DOCD: CPT | Performed by: INTERNAL MEDICINE

## 2024-09-12 PROCEDURE — 3078F DIAST BP <80 MM HG: CPT | Performed by: INTERNAL MEDICINE

## 2024-09-12 PROCEDURE — 81003 URINALYSIS AUTO W/O SCOPE: CPT

## 2024-09-12 PROCEDURE — 80053 COMPREHEN METABOLIC PANEL: CPT

## 2024-09-12 PROCEDURE — 85027 COMPLETE CBC AUTOMATED: CPT

## 2024-09-12 PROCEDURE — G2211 COMPLEX E/M VISIT ADD ON: HCPCS | Performed by: INTERNAL MEDICINE

## 2024-09-12 PROCEDURE — 1123F ACP DISCUSS/DSCN MKR DOCD: CPT | Performed by: INTERNAL MEDICINE

## 2024-09-12 PROCEDURE — 1158F ADVNC CARE PLAN TLK DOCD: CPT | Performed by: INTERNAL MEDICINE

## 2024-09-12 PROCEDURE — 90471 IMMUNIZATION ADMIN: CPT | Performed by: INTERNAL MEDICINE

## 2024-09-12 PROCEDURE — 90662 IIV NO PRSV INCREASED AG IM: CPT | Performed by: INTERNAL MEDICINE

## 2024-09-12 PROCEDURE — 90472 IMMUNIZATION ADMIN EACH ADD: CPT | Performed by: INTERNAL MEDICINE

## 2024-09-12 PROCEDURE — 1036F TOBACCO NON-USER: CPT | Performed by: INTERNAL MEDICINE

## 2024-09-12 PROCEDURE — 90677 PCV20 VACCINE IM: CPT | Performed by: INTERNAL MEDICINE

## 2024-09-12 PROCEDURE — 36415 COLL VENOUS BLD VENIPUNCTURE: CPT

## 2024-09-12 PROCEDURE — 3075F SYST BP GE 130 - 139MM HG: CPT | Performed by: INTERNAL MEDICINE

## 2024-09-12 PROCEDURE — 1159F MED LIST DOCD IN RCRD: CPT | Performed by: INTERNAL MEDICINE

## 2024-09-12 RX ORDER — ATORVASTATIN CALCIUM 10 MG/1
10 TABLET, FILM COATED ORAL DAILY
Qty: 90 TABLET | Refills: 3 | Status: SHIPPED | OUTPATIENT
Start: 2024-09-12 | End: 2025-09-12

## 2024-09-12 RX ORDER — HYDROCHLOROTHIAZIDE 25 MG/1
25 TABLET ORAL DAILY
Qty: 90 TABLET | Refills: 3 | Status: SHIPPED | OUTPATIENT
Start: 2024-09-12

## 2024-09-12 ASSESSMENT — PATIENT HEALTH QUESTIONNAIRE - PHQ9
1. LITTLE INTEREST OR PLEASURE IN DOING THINGS: NOT AT ALL
SUM OF ALL RESPONSES TO PHQ9 QUESTIONS 1 & 2: 0
2. FEELING DOWN, DEPRESSED OR HOPELESS: NOT AT ALL

## 2024-09-12 NOTE — PROGRESS NOTES
Subjective   Patient ID: Facundo Ravi is a 64 y.o. male who presents for Follow-up.    HPI   Tolerating statin without issues.    Compliant with hydrochlorothiazide.    Not checking BP at home often. Did check once in the past few weeks 120s/70s.    Staying active  Basketball twice per week. Plays softball. No longer running, L knee feeling much better.  Diet very healthy.    Review of Systems    Objective   /79 (BP Location: Right arm, Patient Position: Sitting)   Pulse (!) 48   Ht 1.829 m (6')   Wt 76.2 kg (168 lb)   SpO2 98%   BMI 22.78 kg/m²     Physical Exam  Constitutional:       Appearance: Normal appearance.   HENT:      Right Ear: Tympanic membrane and ear canal normal.      Left Ear: Tympanic membrane and ear canal normal.      Mouth/Throat:      Mouth: Mucous membranes are moist.   Eyes:      Extraocular Movements: Extraocular movements intact.      Pupils: Pupils are equal, round, and reactive to light.   Cardiovascular:      Rate and Rhythm: Normal rate and regular rhythm.      Heart sounds: No murmur heard.  Pulmonary:      Effort: Pulmonary effort is normal.      Breath sounds: Normal breath sounds.   Abdominal:      General: Bowel sounds are normal.      Palpations: Abdomen is soft.      Tenderness: There is no abdominal tenderness.   Musculoskeletal:      Cervical back: Neck supple.      Right lower leg: No edema.      Left lower leg: No edema.   Skin:     Findings: No rash.   Neurological:      Mental Status: He is alert.      Cranial Nerves: No cranial nerve deficit.      Motor: No weakness.      Gait: Gait normal.         Assessment/Plan   Problem List Items Addressed This Visit             ICD-10-CM    Agatston CAC score, >400 R93.1    Relevant Medications    atorvastatin (Lipitor) 10 mg tablet    Hypertension I10    Relevant Medications    hydroCHLOROthiazide (HYDRODiuril) 25 mg tablet    Annual physical exam - Primary Z00.00    Relevant Orders    CBC    Comprehensive Metabolic  Panel    Lipid Panel    Prostate Specific Antigen, Screen    Urinalysis with Reflex Microscopic     Other Visit Diagnoses         Codes    Need for pneumococcal 20-valent conjugate vaccination     Z23    Relevant Orders    Pneumococcal conjugate vaccine, 20-valent (PREVNAR 20) (Completed)    Need for influenza vaccination     Z23    Relevant Orders    Flu vaccine, trivalent, preservative free, HIGH-DOSE, age 65y+ (Fluzone) (Completed)             HTN  -Controlled at home. Continue current med.      CAC score >500 in 2021  -Continue statin     L knee OA - stable     Health maintenance:  -Colon cancer screening - Colonoscopy 1/2024 with Dr Vollweiler, normal, due in 5 years due to fam hx.   -Prostate - PSA nml 10/2023  -HCV neg  -Immunizations    -Pneumonia - Prevnar 20 today   -Shingrix UTD   -Flu - today     Follow up 1 yr mwv

## 2024-09-16 ENCOUNTER — TELEPHONE (OUTPATIENT)
Dept: PRIMARY CARE | Facility: CLINIC | Age: 64
End: 2024-09-16
Payer: COMMERCIAL

## 2024-09-16 NOTE — TELEPHONE ENCOUNTER
----- Message from Giovanni Landa sent at 9/14/2024 11:32 AM EDT -----  Labs all normal. Continue same meds.

## 2025-09-19 ENCOUNTER — APPOINTMENT (OUTPATIENT)
Dept: PRIMARY CARE | Facility: CLINIC | Age: 65
End: 2025-09-19
Payer: COMMERCIAL

## (undated) DEVICE — SLEEVE, VASO PRESS, CALF GARMENT, MEDIUM, GREEN

## (undated) DEVICE — DRESSING, TRANSPARENT, TEGADERM, FILM FRAME STYLE, LF

## (undated) DEVICE — GOWN, ASTOUND, XL

## (undated) DEVICE — SCISSOR TIP, ENDOCUT, LAPAROSCOPIC

## (undated) DEVICE — Device

## (undated) DEVICE — OBTURATOR, BLADELESS , SU

## (undated) DEVICE — NEEDLE, HYPODERMIC, SAFETYGLIDE, SHIELDING, REGULAR WALL, REGULAR BEVEL, 22 G X 1.5 IN, BLACK HUB

## (undated) DEVICE — DRESSING, GAUZE, SPONGE, 8 PLY, CURITY, 2 X 2 IN, STERILE

## (undated) DEVICE — SEAL, UNIVERSAL 5-8MM  XI

## (undated) DEVICE — APPLICATOR, CHLORAPREP, W/ORANGE TINT, 26ML

## (undated) DEVICE — CATHETER TRAY, SURESTEP, 16FR, URINE METER W/STATLOCK

## (undated) DEVICE — DRAPE, SHEET, THREE QUARTER, FAN FOLD, 57 X 77 IN

## (undated) DEVICE — MARKER, SKIN, DUAL TIP, W/RULER

## (undated) DEVICE — BANDAGE, GAUZE, CONFORMING, KERLIX, 6 PLY, 4.5 IN X 4.1 YD

## (undated) DEVICE — CARE KIT, LAPAROSCOPIC, ADVANCED

## (undated) DEVICE — TUBE SET, PNEUMOLAR HEATED, SMOKE EVACU, HIGH-FLOW

## (undated) DEVICE — SYRINGE, 20 CC, LUER SLIP

## (undated) DEVICE — STRIP, SKIN CLOSURE, STERI STRIP, REINFORCED, 0.5 X 4 IN

## (undated) DEVICE — CAUTERY, PENCIL, PUSH BUTTON, SMOKE EVAC, 70MM